# Patient Record
Sex: FEMALE | Race: WHITE | NOT HISPANIC OR LATINO | Employment: OTHER | URBAN - METROPOLITAN AREA
[De-identification: names, ages, dates, MRNs, and addresses within clinical notes are randomized per-mention and may not be internally consistent; named-entity substitution may affect disease eponyms.]

---

## 2023-02-15 PROBLEM — M79.606 LOWER EXTREMITY PAIN: Status: ACTIVE | Noted: 2023-02-15

## 2023-02-15 PROBLEM — H35.30 MACULAR DEGENERATION: Status: ACTIVE | Noted: 2023-02-15

## 2023-02-15 PROBLEM — U07.1 COVID-19 VIRUS INFECTION: Status: ACTIVE | Noted: 2023-02-15

## 2023-02-15 PROBLEM — R10.9 LEFT FLANK PAIN: Status: ACTIVE | Noted: 2023-02-15

## 2023-02-15 PROBLEM — H00.015 HORDEOLUM EXTERNUM OF LEFT LOWER EYELID: Status: ACTIVE | Noted: 2023-02-15

## 2023-02-15 PROBLEM — R41.3 MEMORY CHANGES: Status: ACTIVE | Noted: 2023-02-15

## 2023-02-15 PROBLEM — K12.1 ORAL ULCER: Status: ACTIVE | Noted: 2023-02-15

## 2023-02-15 PROBLEM — E55.9 MILD VITAMIN D DEFICIENCY: Status: ACTIVE | Noted: 2023-02-15

## 2023-02-15 PROBLEM — G47.20 CIRCADIAN RHYTHM SLEEP DISORDER: Status: ACTIVE | Noted: 2023-02-15

## 2023-02-15 PROBLEM — S92.323A FRACTURE OF SECOND METATARSAL BONE: Status: ACTIVE | Noted: 2023-02-15

## 2023-02-15 PROBLEM — N20.0 CALCULUS OF KIDNEY: Status: ACTIVE | Noted: 2023-02-15

## 2023-02-15 PROBLEM — M62.830 LUMBAR PARASPINAL MUSCLE SPASM: Status: ACTIVE | Noted: 2023-02-15

## 2023-02-15 PROBLEM — R06.02 SOB (SHORTNESS OF BREATH): Status: ACTIVE | Noted: 2023-02-15

## 2023-02-15 PROBLEM — J21.9 ACUTE BRONCHITIS AND BRONCHIOLITIS: Status: ACTIVE | Noted: 2023-02-15

## 2023-02-15 PROBLEM — N31.8 SPASTIC NEUROGENIC BLADDER: Status: ACTIVE | Noted: 2023-02-15

## 2023-02-15 PROBLEM — R29.6 FALLING EPISODES: Status: ACTIVE | Noted: 2023-02-15

## 2023-02-15 PROBLEM — R91.8 MULTIPLE PULMONARY NODULES: Status: ACTIVE | Noted: 2023-02-15

## 2023-02-15 PROBLEM — J20.9 ACUTE BRONCHITIS AND BRONCHIOLITIS: Status: ACTIVE | Noted: 2023-02-15

## 2023-02-15 PROBLEM — M53.3 PAIN OF LEFT SACROILIAC JOINT: Status: ACTIVE | Noted: 2023-02-15

## 2023-02-15 PROBLEM — M50.20 HERNIATED CERVICAL DISC: Status: ACTIVE | Noted: 2023-02-15

## 2023-02-15 PROBLEM — L40.9 PSORIASIS: Status: ACTIVE | Noted: 2023-02-15

## 2023-02-15 PROBLEM — K57.32 DIVERTICULITIS, COLON: Status: ACTIVE | Noted: 2023-02-15

## 2023-02-15 PROBLEM — J18.9 PNEUMONIA: Status: ACTIVE | Noted: 2023-02-15

## 2023-02-15 PROBLEM — E03.9 ACQUIRED HYPOTHYROIDISM: Status: ACTIVE | Noted: 2023-02-15

## 2023-02-15 PROBLEM — M54.16 ACUTE LUMBAR RADICULOPATHY: Status: ACTIVE | Noted: 2023-02-15

## 2023-02-15 PROBLEM — G62.9 PERIPHERAL NEUROPATHY: Status: ACTIVE | Noted: 2023-02-15

## 2023-02-15 PROBLEM — R32 URINARY INCONTINENCE: Status: ACTIVE | Noted: 2023-02-15

## 2023-02-15 PROBLEM — M21.40 PES PLANUS: Status: ACTIVE | Noted: 2023-02-15

## 2023-02-15 PROBLEM — L29.9 PRURITUS: Status: ACTIVE | Noted: 2023-02-15

## 2023-02-15 PROBLEM — K13.79 BLEEDING FROM MOUTH: Status: ACTIVE | Noted: 2023-02-15

## 2023-02-15 PROBLEM — M81.0 OSTEOPOROSIS, SENILE: Status: ACTIVE | Noted: 2023-02-15

## 2023-02-15 PROBLEM — N20.1 RIGHT URETERAL CALCULUS: Status: ACTIVE | Noted: 2023-02-15

## 2023-02-15 PROBLEM — I10 HTN (HYPERTENSION): Status: ACTIVE | Noted: 2023-02-15

## 2023-02-15 PROBLEM — R05.9 COUGH: Status: ACTIVE | Noted: 2023-02-15

## 2023-02-15 PROBLEM — I25.10 ATHEROSCLEROTIC HEART DISEASE OF NATIVE CORONARY ARTERY WITHOUT ANGINA PECTORIS: Status: ACTIVE | Noted: 2023-02-15

## 2023-02-15 PROBLEM — M54.50 LOWER BACK PAIN: Status: ACTIVE | Noted: 2023-02-15

## 2023-02-15 PROBLEM — R10.13 ABDOMINAL PAIN, EPIGASTRIC: Status: ACTIVE | Noted: 2023-02-15

## 2023-02-15 PROBLEM — F43.10 POSTTRAUMATIC STRESS DISORDER: Status: ACTIVE | Noted: 2023-02-15

## 2023-02-15 PROBLEM — F41.1 ANXIETY STATE: Status: ACTIVE | Noted: 2023-02-15

## 2023-02-15 PROBLEM — E78.5 HYPERLIPIDEMIA: Status: ACTIVE | Noted: 2023-02-15

## 2023-02-15 PROBLEM — S99.922A TRAUMA OF TOE OF LEFT FOOT: Status: ACTIVE | Noted: 2023-02-15

## 2023-02-15 PROBLEM — G43.109 OCULAR MIGRAINE: Status: ACTIVE | Noted: 2023-02-15

## 2023-02-15 PROBLEM — F41.8 DEPRESSION WITH ANXIETY: Status: ACTIVE | Noted: 2023-02-15

## 2023-02-15 PROBLEM — M06.9 RHEUMATOID ARTHRITIS (MULTI): Status: ACTIVE | Noted: 2023-02-15

## 2023-02-15 PROBLEM — H92.22 OTORRHAGIA OF LEFT EAR: Status: ACTIVE | Noted: 2023-02-15

## 2023-02-15 PROBLEM — M70.61 GREATER TROCHANTERIC BURSITIS, RIGHT: Status: ACTIVE | Noted: 2023-02-15

## 2023-02-15 PROBLEM — R42 DIZZINESS: Status: ACTIVE | Noted: 2023-02-15

## 2023-02-15 PROBLEM — K29.70 GASTRITIS: Status: ACTIVE | Noted: 2023-02-15

## 2023-02-15 PROBLEM — J45.909 ASTHMA (HHS-HCC): Status: ACTIVE | Noted: 2023-02-15

## 2023-02-15 PROBLEM — J45.901 ASTHMA EXACERBATION (HHS-HCC): Status: ACTIVE | Noted: 2023-02-15

## 2023-02-15 RX ORDER — LOSARTAN POTASSIUM 50 MG/1
50 TABLET ORAL DAILY
COMMUNITY
Start: 2021-03-05 | End: 2024-01-10 | Stop reason: SDUPTHER

## 2023-02-15 RX ORDER — FLUTICASONE PROPIONATE 110 UG/1
2 AEROSOL, METERED RESPIRATORY (INHALATION)
COMMUNITY
Start: 2021-07-28

## 2023-02-15 RX ORDER — GABAPENTIN 100 MG/1
1 CAPSULE ORAL NIGHTLY
COMMUNITY
Start: 2022-04-01 | End: 2023-10-16

## 2023-02-15 RX ORDER — DOXEPIN HYDROCHLORIDE 10 MG/1
20 CAPSULE ORAL NIGHTLY
COMMUNITY
Start: 2022-08-22 | End: 2023-10-16

## 2023-02-15 RX ORDER — VIT C/E/ZN/COPPR/LUTEIN/ZEAXAN 250MG-90MG
25 CAPSULE ORAL DAILY
COMMUNITY

## 2023-02-15 RX ORDER — NITROGLYCERIN 0.4 MG/1
0.4 TABLET SUBLINGUAL EVERY 5 MIN PRN
COMMUNITY
Start: 2021-02-09

## 2023-02-15 RX ORDER — ATORVASTATIN CALCIUM 40 MG/1
1 TABLET, FILM COATED ORAL NIGHTLY
COMMUNITY
Start: 2021-03-08

## 2023-02-15 RX ORDER — LEVOTHYROXINE SODIUM 25 UG/1
25 TABLET ORAL DAILY
COMMUNITY
Start: 2021-10-28 | End: 2023-10-16 | Stop reason: SDUPTHER

## 2023-02-15 RX ORDER — GABAPENTIN 300 MG/1
1 CAPSULE ORAL NIGHTLY
COMMUNITY
Start: 2022-08-08 | End: 2023-10-16

## 2023-02-15 RX ORDER — ASPIRIN 81 MG/1
1 TABLET ORAL DAILY
COMMUNITY
Start: 2021-12-03 | End: 2023-11-03 | Stop reason: ALTCHOICE

## 2023-02-15 RX ORDER — SERTRALINE HYDROCHLORIDE 25 MG/1
0.5 TABLET, FILM COATED ORAL DAILY
COMMUNITY
Start: 2021-03-08

## 2023-02-15 RX ORDER — METOPROLOL TARTRATE 25 MG/1
6.25 TABLET, FILM COATED ORAL 2 TIMES DAILY
COMMUNITY
Start: 2021-03-08

## 2023-02-15 RX ORDER — CLOPIDOGREL BISULFATE 75 MG/1
1 TABLET ORAL DAILY
COMMUNITY
Start: 2021-12-03 | End: 2023-10-16 | Stop reason: SDUPTHER

## 2023-02-17 PROBLEM — R31.9 HEMATURIA: Status: ACTIVE | Noted: 2023-02-17

## 2023-04-10 ENCOUNTER — APPOINTMENT (OUTPATIENT)
Dept: PRIMARY CARE | Facility: CLINIC | Age: 88
End: 2023-04-10
Payer: MEDICARE

## 2023-04-25 ENCOUNTER — LAB (OUTPATIENT)
Dept: LAB | Facility: LAB | Age: 88
End: 2023-04-25
Payer: MEDICARE

## 2023-04-25 ENCOUNTER — OFFICE VISIT (OUTPATIENT)
Dept: PRIMARY CARE | Facility: CLINIC | Age: 88
End: 2023-04-25
Payer: MEDICARE

## 2023-04-25 VITALS
BODY MASS INDEX: 24.8 KG/M2 | WEIGHT: 127 LBS | DIASTOLIC BLOOD PRESSURE: 84 MMHG | TEMPERATURE: 96.4 F | SYSTOLIC BLOOD PRESSURE: 130 MMHG

## 2023-04-25 DIAGNOSIS — E78.5 HYPERLIPIDEMIA, UNSPECIFIED HYPERLIPIDEMIA TYPE: ICD-10-CM

## 2023-04-25 DIAGNOSIS — Z00.00 ROUTINE GENERAL MEDICAL EXAMINATION AT HEALTH CARE FACILITY: Primary | ICD-10-CM

## 2023-04-25 DIAGNOSIS — J45.909 ASTHMA, UNSPECIFIED ASTHMA SEVERITY, UNSPECIFIED WHETHER COMPLICATED, UNSPECIFIED WHETHER PERSISTENT (HHS-HCC): ICD-10-CM

## 2023-04-25 DIAGNOSIS — I25.10 ATHEROSCLEROSIS OF NATIVE CORONARY ARTERY OF NATIVE HEART WITHOUT ANGINA PECTORIS: ICD-10-CM

## 2023-04-25 DIAGNOSIS — E55.9 MILD VITAMIN D DEFICIENCY: ICD-10-CM

## 2023-04-25 DIAGNOSIS — F41.1 ANXIETY STATE: ICD-10-CM

## 2023-04-25 DIAGNOSIS — E03.9 ACQUIRED HYPOTHYROIDISM: ICD-10-CM

## 2023-04-25 DIAGNOSIS — G62.9 NEUROPATHY: ICD-10-CM

## 2023-04-25 DIAGNOSIS — G62.9 NEUROPATHY: Primary | ICD-10-CM

## 2023-04-25 DIAGNOSIS — I10 HYPERTENSION, UNSPECIFIED TYPE: ICD-10-CM

## 2023-04-25 PROCEDURE — G0439 PPPS, SUBSEQ VISIT: HCPCS | Performed by: INTERNAL MEDICINE

## 2023-04-25 PROCEDURE — 3075F SYST BP GE 130 - 139MM HG: CPT | Performed by: INTERNAL MEDICINE

## 2023-04-25 PROCEDURE — 3079F DIAST BP 80-89 MM HG: CPT | Performed by: INTERNAL MEDICINE

## 2023-04-25 PROCEDURE — 99214 OFFICE O/P EST MOD 30 MIN: CPT | Performed by: INTERNAL MEDICINE

## 2023-04-25 RX ORDER — GABAPENTIN 100 MG/1
100 CAPSULE ORAL 3 TIMES DAILY
Qty: 90 CAPSULE | Refills: 5 | Status: SHIPPED | OUTPATIENT
Start: 2023-04-25 | End: 2023-11-03 | Stop reason: SDUPTHER

## 2023-04-25 ASSESSMENT — ENCOUNTER SYMPTOMS
COUGH: 0
BLOOD IN STOOL: 0
DIZZINESS: 0
DYSURIA: 0
DIFFICULTY URINATING: 0
CONFUSION: 0
DIARRHEA: 0
FEVER: 0
FLANK PAIN: 0
VOMITING: 0
CONSTIPATION: 0
ARTHRALGIAS: 0
NERVOUS/ANXIOUS: 0
HEMATURIA: 0
SORE THROAT: 0
UNEXPECTED WEIGHT CHANGE: 0
POLYDIPSIA: 0
HEADACHES: 0
LIGHT-HEADEDNESS: 0
SLEEP DISTURBANCE: 0
NUMBNESS: 0
CHILLS: 0
BACK PAIN: 1
MYALGIAS: 0
PALPITATIONS: 0
NAUSEA: 0
SHORTNESS OF BREATH: 0
ABDOMINAL PAIN: 0
FREQUENCY: 0

## 2023-04-25 ASSESSMENT — PATIENT HEALTH QUESTIONNAIRE - PHQ9
2. FEELING DOWN, DEPRESSED OR HOPELESS: NOT AT ALL
SUM OF ALL RESPONSES TO PHQ9 QUESTIONS 1 AND 2: 0
1. LITTLE INTEREST OR PLEASURE IN DOING THINGS: NOT AT ALL

## 2023-04-25 NOTE — PROGRESS NOTES
Subjective   Reason for Visit: Annita Mcleod is an 89 y.o. female here for a Medicare Wellness visit.               She is here with daughter. She is adherent with meds. Complains of back pain, especially int the morning, but is otherwise doing well.     Chief Complaint: Medicare Wellness Exam/Comprehensive Problem Focused Follow Up and Physical Exam    HPI:      Active Problem List      Comprehensive Medical/Surgical/Social/Family History  Past Medical History:   Diagnosis Date    Abnormal coagulation profile 04/29/2019    Abnormal bleeding time    Other conditions influencing health status 02/24/2017    Dog bite of extremity    Pain in unspecified hip 05/24/2017    Joint pain, hip    Personal history of diseases of the blood and blood-forming organs and certain disorders involving the immune mechanism 04/29/2019    History of bleeding disorder    Personal history of other diseases of the musculoskeletal system and connective tissue 05/27/2014    History of muscle spasm    Personal history of other diseases of the musculoskeletal system and connective tissue 01/18/2017    History of low back pain    Personal history of other specified conditions 02/24/2017    History of abdominal pain     No past surgical history on file.  Social History     Social History Narrative    Not on file         Allergies and Medications  Patient has no known allergies.  Current Outpatient Medications on File Prior to Visit   Medication Sig Dispense Refill    aspirin 81 mg EC tablet Take 1 tablet (81 mg) by mouth once daily.      atorvastatin (Lipitor) 40 mg tablet Take 1 tablet (40 mg) by mouth once daily at bedtime.      clopidogrel (Plavix) 75 mg tablet Take 1 tablet (75 mg) by mouth once daily.      fluticasone (Flovent) 110 mcg/actuation inhaler Inhale 2 puffs  in the morning and 2 puffs in the evening.      gabapentin (Neurontin) 100 mg capsule Take 1 capsule (100 mg) by mouth once daily at bedtime.      levothyroxine (Synthroid,  "Levoxyl) 25 mcg tablet Take 2 tablets (50 mcg) by mouth once daily.      losartan (Cozaar) 50 mg tablet Take 1 tablet (50 mg) by mouth once daily.      metoprolol tartrate (Lopressor) 25 mg tablet Take 1 tablet (25 mg) by mouth in the morning and 1 tablet (25 mg) before bedtime.      sertraline (Zoloft) 25 mg tablet Take 1 tablet (25 mg) by mouth once daily.      cholecalciferol (Vitamin D-3) 25 MCG (1000 UT) capsule Take 1 capsule (25 mcg) by mouth once daily.      doxepin (SINEquan) 10 mg capsule Take 2 capsules (20 mg) by mouth once daily at bedtime.      gabapentin (Neurontin) 300 mg capsule Take 1 capsule (300 mg) by mouth once daily at bedtime.      nitroglycerin (Nitrostat) 0.4 mg SL tablet Place 1 tablet (0.4 mg) under the tongue every 5 minutes if needed for chest pain.       No current facility-administered medications on file prior to visit.       Medications and Supplements  prescribed by me and other practitioners or clinical pharmacist (such as prescriptions, OTC's, herbal therapies and supplements) were reviewed and documented in the medical record.    Tobacco/Alcohol/Opioid use, as well as Illicit Drug Use was screened for/reviewed and documented in Social History section and medication list as appropriate  Activities of Daily Living  In your present state of health, do you have any difficulty performing the following activities?:   Preparing food and eating?: No  Bathing yourself: No  Getting dressed: No  Using the toilet:No  Moving around from place to place: No  In the past year have you fallen or had a near fall?:No    Depression Screen  (Note: if answer to either of the following is \"Yes\", then a more complete depression screening is indicated)   Q1: Over the past two weeks, have you felt down, depressed or hopeless? No  Q2: Over the past two weeks, have you felt little interest or pleasure in doing things? No    Current exercise habits: Home exercise routine includes walking half hrs per day. "   Dietary issues discussed: Yes  Hearing difficulties: No  Safe in current home environment: yes  Visual Acuity assessed: yes  Cognitive Impairment assessed: yes       Advance directives  Advanced Care Planning (including a Living Will, Healthcare POA, as well as specific end of life choices and/or directives), was discussed for approximately 16 minutes with the patient and/or surrogate, voluntarily, and documented in the medical record.     Cardiac Risk Assessment  Cardiovascular risk was discussed and, if needed, lifestyle modifications recommended, including nutritional choices, exercise, and elimination of habits contributing to risk. We agreed on a plan to reduce the current cardiovascular risk based on above discussion as needed.  Aspirin use/disuse was discussed after reviewing the updated guidelines below:    Consider low dose Aspirin ( mg) use if the benefit for cardiovascular disease prevention outweighs risk for bleeding complications.   In general, low dose ASA should be considered:  In patients WITHOUT prior MI/stroke/PAD (primary prevention):   a. Age <60: Use if 10-year cardiovascular disease risk >20%, with discussion of risks and benefits with patient  b. Age 60-<70: Use if 10-year cardiovascular disease risk >20% and low bleeding (e.g., gastrointenstinal) risk, with discussion of risks and benefits with patient  c. Age >=70: Do not use    In patients WITH prior MI/stroke/PAD (secondary prevention):   Generally use unless extremely high bleeding (e.g., gastrointenstinal) risk, with discussion of risks and benefits with patient    ROS otherwise negative aside from what was mentioned above in HPI.    Vitals  Vitals:    04/25/23 1146   BP: 130/84   Temp: 35.8 °C (96.4 °F)     Body mass index is 24.8 kg/m².        During the course of the visit the patient was educated and counseled about age appropriate screening and preventive services. Completed preventive screenings were documented in the  chart and orders were placed for outstanding screenings/procedures as documented in the Assessment and Plan.      Patient Instructions (the written plan) was given to the patient at check out.      Shen Cardona DO     Patient Care Team:  Viv Chadwick MD as PCP - General  Jordi Aceves MD as PCP - [employer] PCP     Review of Systems   Constitutional:  Negative for chills, fever and unexpected weight change.   HENT:  Negative for sore throat.    Eyes:  Negative for visual disturbance.   Respiratory:  Negative for cough and shortness of breath.    Cardiovascular:  Negative for chest pain, palpitations and leg swelling.   Gastrointestinal:  Negative for abdominal pain, blood in stool, constipation, diarrhea, nausea and vomiting.   Endocrine: Negative for cold intolerance, heat intolerance, polydipsia and polyuria.   Genitourinary:  Negative for difficulty urinating, dysuria, flank pain, frequency, hematuria and urgency.   Musculoskeletal:  Positive for back pain. Negative for arthralgias and myalgias.   Neurological:  Negative for dizziness, light-headedness, numbness and headaches.   Psychiatric/Behavioral:  Negative for confusion, sleep disturbance and suicidal ideas. The patient is not nervous/anxious.        Objective   Vitals:  /84   Temp 35.8 °C (96.4 °F)   Wt 57.6 kg (127 lb)   BMI 24.80 kg/m²       Physical Exam  Constitutional:       Appearance: Normal appearance.   HENT:      Head: Normocephalic and atraumatic.      Right Ear: Tympanic membrane and external ear normal.      Left Ear: Tympanic membrane and external ear normal.      Nose: Nose normal.      Mouth/Throat:      Mouth: Mucous membranes are moist.      Pharynx: Oropharynx is clear.   Eyes:      Extraocular Movements: Extraocular movements intact.      Conjunctiva/sclera: Conjunctivae normal.      Pupils: Pupils are equal, round, and reactive to light.   Cardiovascular:      Rate and Rhythm: Normal rate and  regular rhythm.      Pulses: Normal pulses.      Heart sounds: Normal heart sounds.   Pulmonary:      Effort: Pulmonary effort is normal.      Breath sounds: Normal breath sounds.   Abdominal:      General: Bowel sounds are normal.      Palpations: Abdomen is soft.   Musculoskeletal:      Cervical back: Normal range of motion and neck supple.   Skin:     General: Skin is warm and dry.   Neurological:      General: No focal deficit present.      Mental Status: She is alert and oriented to person, place, and time. Mental status is at baseline.   Psychiatric:         Mood and Affect: Mood normal.         Assessment/Plan   Problem List Items Addressed This Visit          Respiratory    Asthma - Primary       Circulatory    Atherosclerotic heart disease of native coronary artery without angina pectoris    Relevant Orders    CBC and Auto Differential    Comprehensive Metabolic Panel    HTN (hypertension)    Relevant Orders    CBC and Auto Differential    Comprehensive Metabolic Panel       Endocrine/Metabolic    Acquired hypothyroidism    Relevant Orders    Tsh With Reflex To Free T4 If Abnormal    Mild vitamin D deficiency    Relevant Orders    Vitamin D 25-Hydroxy,Total       Other    Anxiety state    Hyperlipidemia    Relevant Orders    Lipid panel        #Back pain  - states she has pain more in the AM, so will change Gabapentin 300mg at bedtime to 100mg TID  - if not effective, will try Duloxetine in the future    #HCM  - obtain routine labs

## 2023-04-25 NOTE — PROGRESS NOTES
I reviewed with the resident the medical history and the resident’s findings on physical examination.  I discussed with the resident the patient’s diagnosis and concur with the treatment plan as documented in the resident note.     I saw and evaluated the patient. I personally obtained the key and critical portions of the history and physical exam or was physically present for key and critical portions performed by the trainee. I reviewed the trainee's documentation and discussed the patient with the trainee. I agree with the trainee's medical decision making, as documented on the trainee's note.     Viv Chadwick MD

## 2023-10-16 ENCOUNTER — OFFICE VISIT (OUTPATIENT)
Dept: PRIMARY CARE | Facility: CLINIC | Age: 88
End: 2023-10-16
Payer: MEDICARE

## 2023-10-16 ENCOUNTER — LAB (OUTPATIENT)
Dept: LAB | Facility: LAB | Age: 88
End: 2023-10-16
Payer: MEDICARE

## 2023-10-16 VITALS
WEIGHT: 126.4 LBS | HEIGHT: 60 IN | DIASTOLIC BLOOD PRESSURE: 63 MMHG | OXYGEN SATURATION: 97 % | SYSTOLIC BLOOD PRESSURE: 125 MMHG | BODY MASS INDEX: 24.81 KG/M2 | HEART RATE: 70 BPM

## 2023-10-16 DIAGNOSIS — R20.2 PARESTHESIA OF FOOT, BILATERAL: ICD-10-CM

## 2023-10-16 DIAGNOSIS — E03.9 HYPOTHYROIDISM, UNSPECIFIED TYPE: ICD-10-CM

## 2023-10-16 DIAGNOSIS — M06.9 RHEUMATOID ARTHRITIS, INVOLVING UNSPECIFIED SITE, UNSPECIFIED WHETHER RHEUMATOID FACTOR PRESENT (MULTI): ICD-10-CM

## 2023-10-16 DIAGNOSIS — Z95.1 H/O FOUR VESSEL CORONARY ARTERY BYPASS GRAFT: ICD-10-CM

## 2023-10-16 DIAGNOSIS — E78.5 HYPERLIPIDEMIA, UNSPECIFIED HYPERLIPIDEMIA TYPE: ICD-10-CM

## 2023-10-16 DIAGNOSIS — Z23 NEED FOR INFLUENZA VACCINATION: ICD-10-CM

## 2023-10-16 DIAGNOSIS — I25.810 CORONARY ARTERY DISEASE INVOLVING CORONARY BYPASS GRAFT OF NATIVE HEART WITHOUT ANGINA PECTORIS: ICD-10-CM

## 2023-10-16 DIAGNOSIS — E03.9 ACQUIRED HYPOTHYROIDISM: ICD-10-CM

## 2023-10-16 DIAGNOSIS — I10 PRIMARY HYPERTENSION: ICD-10-CM

## 2023-10-16 DIAGNOSIS — Z76.89 ENCOUNTER TO ESTABLISH CARE: Primary | ICD-10-CM

## 2023-10-16 DIAGNOSIS — I21.A9 OTHER MYOCARDIAL INFARCTION TYPE (MULTI): ICD-10-CM

## 2023-10-16 LAB
ALBUMIN SERPL BCP-MCNC: 4.4 G/DL (ref 3.4–5)
ALP SERPL-CCNC: 84 U/L (ref 33–136)
ALT SERPL W P-5'-P-CCNC: 13 U/L (ref 7–45)
ANION GAP SERPL CALC-SCNC: 12 MMOL/L (ref 10–20)
AST SERPL W P-5'-P-CCNC: 16 U/L (ref 9–39)
BILIRUB SERPL-MCNC: 0.4 MG/DL (ref 0–1.2)
BUN SERPL-MCNC: 21 MG/DL (ref 6–23)
CALCIUM SERPL-MCNC: 9.3 MG/DL (ref 8.6–10.3)
CHLORIDE SERPL-SCNC: 105 MMOL/L (ref 98–107)
CO2 SERPL-SCNC: 28 MMOL/L (ref 21–32)
CREAT SERPL-MCNC: 0.65 MG/DL (ref 0.5–1.05)
ERYTHROCYTE [DISTWIDTH] IN BLOOD BY AUTOMATED COUNT: 13.9 % (ref 11.5–14.5)
GFR SERPL CREATININE-BSD FRML MDRD: 86 ML/MIN/1.73M*2
GLUCOSE SERPL-MCNC: 87 MG/DL (ref 74–99)
HCT VFR BLD AUTO: 44.6 % (ref 36–46)
HGB BLD-MCNC: 14.2 G/DL (ref 12–16)
MCH RBC QN AUTO: 27.6 PG (ref 26–34)
MCHC RBC AUTO-ENTMCNC: 31.8 G/DL (ref 32–36)
MCV RBC AUTO: 87 FL (ref 80–100)
NRBC BLD-RTO: 0 /100 WBCS (ref 0–0)
PLATELET # BLD AUTO: 329 X10*3/UL (ref 150–450)
PMV BLD AUTO: 8.8 FL (ref 7.5–11.5)
POTASSIUM SERPL-SCNC: 4.3 MMOL/L (ref 3.5–5.3)
PROT SERPL-MCNC: 6.7 G/DL (ref 6.4–8.2)
RBC # BLD AUTO: 5.15 X10*6/UL (ref 4–5.2)
SODIUM SERPL-SCNC: 141 MMOL/L (ref 136–145)
T4 FREE SERPL-MCNC: 0.86 NG/DL (ref 0.61–1.12)
TSH SERPL-ACNC: 4.9 MIU/L (ref 0.44–3.98)
WBC # BLD AUTO: 6 X10*3/UL (ref 4.4–11.3)

## 2023-10-16 PROCEDURE — 80053 COMPREHEN METABOLIC PANEL: CPT

## 2023-10-16 PROCEDURE — 1036F TOBACCO NON-USER: CPT | Performed by: STUDENT IN AN ORGANIZED HEALTH CARE EDUCATION/TRAINING PROGRAM

## 2023-10-16 PROCEDURE — 1160F RVW MEDS BY RX/DR IN RCRD: CPT | Performed by: STUDENT IN AN ORGANIZED HEALTH CARE EDUCATION/TRAINING PROGRAM

## 2023-10-16 PROCEDURE — 82607 VITAMIN B-12: CPT

## 2023-10-16 PROCEDURE — 3078F DIAST BP <80 MM HG: CPT | Performed by: STUDENT IN AN ORGANIZED HEALTH CARE EDUCATION/TRAINING PROGRAM

## 2023-10-16 PROCEDURE — G0008 ADMIN INFLUENZA VIRUS VAC: HCPCS | Performed by: STUDENT IN AN ORGANIZED HEALTH CARE EDUCATION/TRAINING PROGRAM

## 2023-10-16 PROCEDURE — 85027 COMPLETE CBC AUTOMATED: CPT

## 2023-10-16 PROCEDURE — 1159F MED LIST DOCD IN RCRD: CPT | Performed by: STUDENT IN AN ORGANIZED HEALTH CARE EDUCATION/TRAINING PROGRAM

## 2023-10-16 PROCEDURE — 83036 HEMOGLOBIN GLYCOSYLATED A1C: CPT

## 2023-10-16 PROCEDURE — 90662 IIV NO PRSV INCREASED AG IM: CPT | Performed by: STUDENT IN AN ORGANIZED HEALTH CARE EDUCATION/TRAINING PROGRAM

## 2023-10-16 PROCEDURE — 82746 ASSAY OF FOLIC ACID SERUM: CPT

## 2023-10-16 PROCEDURE — 84439 ASSAY OF FREE THYROXINE: CPT

## 2023-10-16 PROCEDURE — 36415 COLL VENOUS BLD VENIPUNCTURE: CPT

## 2023-10-16 PROCEDURE — 3074F SYST BP LT 130 MM HG: CPT | Performed by: STUDENT IN AN ORGANIZED HEALTH CARE EDUCATION/TRAINING PROGRAM

## 2023-10-16 PROCEDURE — 99214 OFFICE O/P EST MOD 30 MIN: CPT | Performed by: STUDENT IN AN ORGANIZED HEALTH CARE EDUCATION/TRAINING PROGRAM

## 2023-10-16 PROCEDURE — 84443 ASSAY THYROID STIM HORMONE: CPT

## 2023-10-16 RX ORDER — CLOPIDOGREL BISULFATE 75 MG/1
75 TABLET ORAL DAILY
Qty: 90 TABLET | Refills: 3 | Status: SHIPPED | OUTPATIENT
Start: 2023-10-16

## 2023-10-16 RX ORDER — AMLODIPINE BESYLATE 2.5 MG/1
2.5 TABLET ORAL 2 TIMES DAILY
COMMUNITY

## 2023-10-16 RX ORDER — LEVOTHYROXINE SODIUM 25 UG/1
25 TABLET ORAL DAILY
Qty: 90 TABLET | Refills: 3 | Status: SHIPPED | OUTPATIENT
Start: 2023-10-16

## 2023-10-16 NOTE — PROGRESS NOTES
"Subjective   Patient ID: Annita Mcleod is a 86 y.o. female who presents for New Patient Visit (Establish care. ).        HPI    Est care   Pt's PMH, PSH, SH, FH , meds and allergies was obtained / reviewed and updated .  Is here with her daughter who is the primary historian      CABG in 2005  Est with cardiology at    Recently had lipids done   Wonders if she can lower the dose of lipitor  to 20mg     COVId 19 infection in 2021 , paresthesia in LEs since then , worse in feet  \"someone pouring hot water \"  on her feet   Gabapentin did not help     Asthma     HTN        Visit Vitals  /63   Pulse 70   Ht 1.524 m (5')   Wt 57.3 kg (126 lb 6.4 oz)   SpO2 97%   BMI 24.69 kg/m²   Smoking Status Never   BSA 1.56 m²      No LMP recorded.     Review of Systems    Constitutional : No feeling poorly / fevers/ chills / night sweats/ fatigue   Cardiovascular : No CP /Palpitations/ lower extremity edema / syncope   Respiratory : No Cough /GODOY/Dyspnea at rest   Gastrointestinal : No abd pain / N/V  No bloody stools/ melena / constipation  Endo : No polyuria/polydipsia/ muscle weakness / sluggishness   CNS: No confusion / HA/ tingling/ numbness/ weakness of extremities  Psychiatric: No anxiety/ depression/ SI/HI    All other systems have been reviewed and are negative for complaint       Physical Exam    Constitutional : Vitals reviewed. Alert and in no distress  Cardiovascular : RRR, Normal S1, S2, No pericardial rub/ gallop, no peripheral edema   Pulmonary: No respiratory distress, CTAB   MSK : Normal gait and station , strength and tone     Neurologic : CNs 2-12 grossly intact , no obvious FNDs  Psych : A,Ox3, normal mood and affect      Assessment/Plan   Diagnoses and all orders for this visit:  Encounter to establish care  Need for influenza vaccination  -     Flu vaccine, quadrivalent, high-dose, preservative free, age 65y+ (FLUZONE)  Rheumatoid arthritis, involving unspecified site, unspecified whether rheumatoid " factor present (CMS/[facility])  H/O four vessel coronary artery bypass graft  -     clopidogrel (Plavix) 75 mg tablet; Take 1 tablet (75 mg) by mouth once daily.  Coronary artery disease involving coronary bypass graft of native heart without angina pectoris  -     clopidogrel (Plavix) 75 mg tablet; Take 1 tablet (75 mg) by mouth once daily.  Primary hypertension  Hyperlipidemia, unspecified hyperlipidemia type  Hypothyroidism, unspecified type  -     levothyroxine (Synthroid, Levoxyl) 25 mcg tablet; Take 1 tablet (25 mcg) by mouth once daily.  Acquired hypothyroidism  Paresthesia of foot, bilateral  -     EMG & nerve conduction; Future  -     Referral to Neurology; Future  -     Vitamin B12; Future  -     Folate; Future  -     CBC; Future  -     Comprehensive Metabolic Panel; Future  -     TSH with reflex to Free T4 if abnormal; Future  Other myocardial infarction type (CMS/[facility])  -     Hemoglobin A1C; Future      85 y/o female with CAD s/p 2005 with MI in 2021 s/p PCI , mild - moderate depression , hypothyroidism and paresthesia of LE    # CAD : Est with cardiology at [facility]   To obtain refills from cardiology (ASA, plavix, Statin and BB )   She is also taking amlodipine 2.5mg BID   Continue statin 40mg     # Paresthesia : EMG and NM referral   Did not notice improvement with gabapentin     # Asthma : Stable     # Mild - moderate depression : on 12.5mg of Zoloft     Conditions addressed and mgmt as noted above.  Pertinent labs, images/ imaging reports , chart review was done .   Age appropriate labs / labs for mgmt of chronic medical conditions ordered, further mgmt pending the results.       RTO in 6m for CPE /MCW

## 2023-10-17 LAB
EST. AVERAGE GLUCOSE BLD GHB EST-MCNC: 108 MG/DL
FOLATE SERPL-MCNC: 11.4 NG/ML
HBA1C MFR BLD: 5.4 %
VIT B12 SERPL-MCNC: 212 PG/ML (ref 211–911)

## 2023-10-18 ENCOUNTER — TELEPHONE (OUTPATIENT)
Dept: PHARMACY | Facility: HOSPITAL | Age: 88
End: 2023-10-18
Payer: MEDICARE

## 2023-10-18 NOTE — TELEPHONE ENCOUNTER
Population Health: Outreach by Ambulatory Pharmacy Team    Payor: [employer] [place]  Reason: Adherence  Medication:   ATORVASTATIN TAB 40MG   LOSARTAN POT TAB 50MG     Outcome: Left Voicemail Atorvastatin was decreased to 20mg by pcp in Epic note, adherence for losartan still need to be addressed.     THELMA LUCIANO CPhT

## 2023-11-03 ENCOUNTER — OFFICE VISIT (OUTPATIENT)
Dept: PRIMARY CARE | Facility: CLINIC | Age: 88
End: 2023-11-03
Payer: MEDICARE

## 2023-11-03 VITALS
HEART RATE: 67 BPM | OXYGEN SATURATION: 96 % | HEIGHT: 60 IN | SYSTOLIC BLOOD PRESSURE: 105 MMHG | DIASTOLIC BLOOD PRESSURE: 60 MMHG | WEIGHT: 128.4 LBS | BODY MASS INDEX: 25.21 KG/M2

## 2023-11-03 DIAGNOSIS — M47.27 LUMBOSACRAL SPONDYLOSIS WITH RADICULOPATHY: Primary | ICD-10-CM

## 2023-11-03 DIAGNOSIS — G62.9 NEUROPATHY: ICD-10-CM

## 2023-11-03 PROCEDURE — 3078F DIAST BP <80 MM HG: CPT | Performed by: STUDENT IN AN ORGANIZED HEALTH CARE EDUCATION/TRAINING PROGRAM

## 2023-11-03 PROCEDURE — 99214 OFFICE O/P EST MOD 30 MIN: CPT | Performed by: STUDENT IN AN ORGANIZED HEALTH CARE EDUCATION/TRAINING PROGRAM

## 2023-11-03 PROCEDURE — 1159F MED LIST DOCD IN RCRD: CPT | Performed by: STUDENT IN AN ORGANIZED HEALTH CARE EDUCATION/TRAINING PROGRAM

## 2023-11-03 PROCEDURE — 1160F RVW MEDS BY RX/DR IN RCRD: CPT | Performed by: STUDENT IN AN ORGANIZED HEALTH CARE EDUCATION/TRAINING PROGRAM

## 2023-11-03 PROCEDURE — 1036F TOBACCO NON-USER: CPT | Performed by: STUDENT IN AN ORGANIZED HEALTH CARE EDUCATION/TRAINING PROGRAM

## 2023-11-03 PROCEDURE — 3074F SYST BP LT 130 MM HG: CPT | Performed by: STUDENT IN AN ORGANIZED HEALTH CARE EDUCATION/TRAINING PROGRAM

## 2023-11-03 RX ORDER — GABAPENTIN 300 MG/1
300 CAPSULE ORAL 3 TIMES DAILY
Qty: 270 CAPSULE | Refills: 1 | Status: SHIPPED | OUTPATIENT
Start: 2023-11-03 | End: 2024-05-01

## 2023-11-03 NOTE — PROGRESS NOTES
Subjective   Patient ID: Annita Mcleod is a 86 y.o. female who presents for Back Pain (Back pain. ).        HPI    Is here with her daughter , who is also the historian     Chronic low back pain , MRI in 2017 - Multi level disc changes   Uses tylenol and salon patches   Occasional radiation to the lower extremities     Visit Vitals  /60   Pulse 67   Ht 1.524 m (5')   Wt 58.2 kg (128 lb 6.4 oz)   SpO2 96%   BMI 25.08 kg/m²   Smoking Status Never   BSA 1.57 m²      No LMP recorded.     Review of Systems    Constitutional : No feeling poorly / fevers/ chills / night sweats/ fatigue   Cardiovascular : No CP /Palpitations/ lower extremity edema / syncope   Respiratory : No Cough /GODOY/Dyspnea at rest     CNS: No confusion / HA/ tingling/ numbness/ weakness of extremities  Psychiatric: No anxiety/ depression/ SI/HI    All other systems have been reviewed and are negative for complaint       Physical Exam    Constitutional : Vitals reviewed. Alert and in no distress  Cardiovascular : RRR, Normal S1, S2, No pericardial rub/ gallop, no peripheral edema   Pulmonary: No respiratory distress, CTAB   MSK : Normal gait and station , strength and tone     Neurologic : CNs 2-12 grossly intact , no obvious FNDs  Psych : A,Ox3, normal mood and affect      Assessment/Plan   Diagnoses and all orders for this visit:  Lumbosacral spondylosis with radiculopathy  -     MR lumbar spine wo IV contrast; Future  -     Referral to Pain Medicine; Future  -     Referral to Physical Therapy; Future  -     Referral to [facility]; Future  Neuropathy  -     gabapentin (Neurontin) 300 mg capsule; Take 1 capsule (300 mg) by mouth 3 times a day.      Can increase Gabapentin 300mg TID   Rpt imaging   Mgmt as noted above       Conditions addressed and mgmt as noted above.  Pertinent labs, images/ imaging reports , chart review was done .   Age appropriate labs / labs for mgmt of chronic medical conditions ordered, further mgmt pending the  results.

## 2023-11-06 ENCOUNTER — APPOINTMENT (OUTPATIENT)
Dept: NEUROLOGY | Facility: CLINIC | Age: 88
End: 2023-11-06
Payer: MEDICARE

## 2023-11-07 NOTE — PROGRESS NOTES
Physical Therapy  Physical Therapy Orthopedic Evaluation    Patient Name: Annita Mcleod  MRN: [id number]  Today's Date: 11/8/2023  Time Calculation  Start Time: 0745  Stop Time: 0830  Time Calculation (min): 45 min    Insurance:  Visit number: 1 of [place]  Authorization info: no auth needed  Insurance Type: [facility] Medicare  Cert date start: 11/8/23        Cert date end: 2/5/24                General:  Reason for visit: LBP and lumbar radiculopathy  Referred by: Dr. Desiree Perez    Current Problem  1. Lower back pain        2. Lumbosacral spondylosis with radiculopathy  Referral to Physical Therapy    Follow Up In Physical Therapy          Precautions  STEADI Fall Risk Score (The score of 4 or more indicates an increased risk of falling): 4    Medical History Form: Reviewed (scanned into chart)    Subjective:     Chief Complaint: Patient presents to clinic with LBP and lumbar radiculopathy L>R with h/o DDD. Is scheduled for another MRI. Has not tried injections. Sees pain management tomorrow.   Onset Date: 1/1/21  TREVOR: got worse after getting COVID    Current Condition:   Worse    Pain:  Pain Assessment: 0-10  Pain Score: 5 - Moderate pain  Location: across low back, pain from shin down to feet   Description: sharp  Aggravating Factors: sitting  Relieving Factors: gabapentin, tylenol, lidocaine patch, heating pad    Relevant Information (PMH & Previous Tests/Imaging): having new MRI in a week.  Previous Interventions/Treatments: None    Prior Level of Function (PLOF)  Patient previously independent with all ADLs  Exercise/Physical Activity: none  Work/School: retired     Patients Living Environment: Reviewed and no concern  Stairs:    Primary Language: English    Patient's Goal(s) for Therapy: Decrease pain and increase quality of life    Red Flags: Do you have any of the following? No  Fever/chills, unexplained weight changes, dizziness/fainting, unexplained change in bowel or bladder functions, unexplained malaise or  muscle weakness, night pain/sweats, numbness or tingling    Objective:    Gait: decreased lavell      Balance: SLS-     R: 2 sec.           L: 2 sec.    Palpation: TTP across low back    Flexibility: mod tight B HS, quad, HF, gastroc    ROM    Lumbar AROM (%)    Flexion: 50% P  Extension: 10% P  (L) Side Bend: 10% P  (R) Side Bend: 10% P  (L) Rotation: 75% P  (R) Rotation: 50% P      Hip AROM (Degrees): R=L       Strength Testing (pain with all testing)    LE strength MMT  R L  Hip flexion  4/5 4/5  Hip extension  2+/5 2+/5  Hip abduction  3+/5 3+/5  Hip adduction  3-/5 3-/5  Hip IR   3+/5 3+/5  Hip ER   3+/5 3+/5  Knee extension 4-/5 4-/5  Knee flexion  4/5 4-/5          Special Tests    Slump Test: positive B    Radicular Symptoms: L>R from shin down to foot      Outcome Measures:  Other Measures  Oswestry Disablity Index (BLANCHE): 29/50     EDUCATION: home exercise program, plan of care, activity modifications, pain management, and injury pathology       Goals: Set and discussed today  Active       PT Problem       STG       Start:  11/08/23    Expected End:  02/06/24       Patient will decrease pain to 0-2/10 in 4 weeks.   Patient will increase strength by >/= 1/2 mm grade in 4 weeks.  Oswestry: -6 in 4 weeks.             LTG       Start:  11/08/23    Expected End:  02/06/24       Patient will be able to walk >10 mins without increased pain in 8 weeks.  Patient will be able to ascend/descend stairs with step through pattern in 8 weeks.    Patient will increase LE flexibility to min tight in 8 weeks.   Patient will be able to maintain SLS >/= 10 seconds in 8 weeks.                Plan of care was developed with input and agreement by the patient      Treatment Performed:  Self-care: Patient educated on possible benefits of aquatic therapy since pain levels are too high to tolerate land PT at this time. Also discussed pain management options as she is meeting with pain med MD this week.      Charges: Low complexity  ravindra, Self care    Assessment: Patient presents with signs and symptoms consistent with LBP and lumbar radiculopathy, resulting in limited participation in pain-free ADLs and inability to perform at their prior level of function. Pt would benefit from physical therapy to address the impairments found & listed previously in the objective section in order to return to safe and pain-free ADLs and prior level of function. Patient going [place] so will discontinue POC when she leaves.       Plan:     Planned Interventions include: therapeutic exercise, self-care home management, manual therapy, therapeutic activities, gait training, neuromuscular coordination, vasopneumatic, dry needling, aquatic therapy  Frequency: 1 x Week  Duration: 8 Weeks    Carrie Choudhury, PT

## 2023-11-08 ENCOUNTER — EVALUATION (OUTPATIENT)
Dept: PHYSICAL THERAPY | Facility: CLINIC | Age: 88
End: 2023-11-08
Payer: MEDICARE

## 2023-11-08 DIAGNOSIS — M47.27 LUMBOSACRAL SPONDYLOSIS WITH RADICULOPATHY: ICD-10-CM

## 2023-11-08 DIAGNOSIS — M54.50 LOWER BACK PAIN: Primary | ICD-10-CM

## 2023-11-08 PROCEDURE — 97535 SELF CARE MNGMENT TRAINING: CPT | Mod: GP

## 2023-11-08 PROCEDURE — 97161 PT EVAL LOW COMPLEX 20 MIN: CPT | Mod: GP

## 2023-11-08 ASSESSMENT — PAIN SCALES - GENERAL: PAINLEVEL_OUTOF10: 5 - MODERATE PAIN

## 2023-11-08 ASSESSMENT — ENCOUNTER SYMPTOMS
DEPRESSION: 0
OCCASIONAL FEELINGS OF UNSTEADINESS: 0
LOSS OF SENSATION IN FEET: 1

## 2023-11-08 ASSESSMENT — PAIN - FUNCTIONAL ASSESSMENT: PAIN_FUNCTIONAL_ASSESSMENT: 0-10

## 2023-11-08 NOTE — LETTER
November 8, 2023     Patient: Annita Mcleod   YOB: 1937   Date of Visit: 11/8/2023       To Whom It May Concern:    It is my medical opinion that Annita Mcleod {Work release (duty restriction):46784}.    If you have any questions or concerns, please don't hesitate to call.         Sincerely,        Carrie Choudhury, PT    CC: No Recipients

## 2023-11-08 NOTE — LETTER
November 8, 2023     Patient: Annita Mcleod   YOB: 1937   Date of Visit: 11/8/2023       To Whom it May Concern:    Annita Mcleod was seen in my clinic on 11/8/2023. She {Return to school/sport:51993}.    If you have any questions or concerns, please don't hesitate to call.         Sincerely,          Carrie Choudhury, PT        CC: No Recipients

## 2023-11-09 ENCOUNTER — OFFICE VISIT (OUTPATIENT)
Dept: PAIN MEDICINE | Facility: CLINIC | Age: 88
End: 2023-11-09
Payer: MEDICARE

## 2023-11-09 VITALS
WEIGHT: 122 LBS | HEART RATE: 58 BPM | HEIGHT: 62 IN | BODY MASS INDEX: 22.45 KG/M2 | RESPIRATION RATE: 17 BRPM | SYSTOLIC BLOOD PRESSURE: 119 MMHG | DIASTOLIC BLOOD PRESSURE: 69 MMHG

## 2023-11-09 DIAGNOSIS — G89.29 CHRONIC BILATERAL LOW BACK PAIN WITHOUT SCIATICA: Primary | ICD-10-CM

## 2023-11-09 DIAGNOSIS — M54.50 CHRONIC BILATERAL LOW BACK PAIN WITHOUT SCIATICA: Primary | ICD-10-CM

## 2023-11-09 PROCEDURE — 99213 OFFICE O/P EST LOW 20 MIN: CPT | Performed by: PAIN MEDICINE

## 2023-11-09 PROCEDURE — 3078F DIAST BP <80 MM HG: CPT | Performed by: PAIN MEDICINE

## 2023-11-09 PROCEDURE — 1125F AMNT PAIN NOTED PAIN PRSNT: CPT | Performed by: PAIN MEDICINE

## 2023-11-09 PROCEDURE — 1036F TOBACCO NON-USER: CPT | Performed by: PAIN MEDICINE

## 2023-11-09 PROCEDURE — 1159F MED LIST DOCD IN RCRD: CPT | Performed by: PAIN MEDICINE

## 2023-11-09 PROCEDURE — 3074F SYST BP LT 130 MM HG: CPT | Performed by: PAIN MEDICINE

## 2023-11-09 PROCEDURE — 1160F RVW MEDS BY RX/DR IN RCRD: CPT | Performed by: PAIN MEDICINE

## 2023-11-09 SDOH — SOCIAL STABILITY: SOCIAL NETWORK: SOCIAL ACTIVITY:: 7

## 2023-11-09 ASSESSMENT — PAIN - FUNCTIONAL ASSESSMENT: PAIN_FUNCTIONAL_ASSESSMENT: 0-10

## 2023-11-09 ASSESSMENT — PAIN SCALES - GENERAL
PAINLEVEL_OUTOF10: 6
PAINLEVEL: 6

## 2023-11-09 NOTE — PROGRESS NOTES
C/o lower back, left more than right. At times numbness and tingling. Down the legs, pain is most of the time. Has a order for referral for MRI, started PT. Currently takes Advil and Tylenol, CBD cream.

## 2023-11-09 NOTE — PROGRESS NOTES
Subjective   Patient ID: Annita Mcleod is a 89 y.o. female who presents for Back Pain (C/o lower back pain).    HPI:   Patient presents to the clinic as a new patient for chronic low back pain. The pain has been present for multiple years. It is sharp pain that is localized to her low back and is greater on the left side than the right. The pain is intermittent, but she is in pain approximately 75% of the time. It worsens with prolonged sitting, bending, and twisting. Walking does not change her pain. She is often pain-free when she wakes up in the morning. Her pain mildly improves with tylenol and CBD cream. She denies any radiation to her lower extremities, numbness, or weakness. She does have burning in her bilateral lower extremities in a stocking-like distribution to her mid-calves for which she is scheduled to have an EMG next week. She will also be starting aqua therapy next week. She is scheduled for an MRI lumbar spine. Most recent MRI is from 2017 which demonstrates mild-moderate stenosis at L3-4 .       Review of Systems   13-point ROS done and negative except for HPI.       Current Outpatient Medications:     amLODIPine (Norvasc) 2.5 mg tablet, Take 1 tablet (2.5 mg) by mouth 2 times a day., Disp: , Rfl:     atorvastatin (Lipitor) 40 mg tablet, Take 1 tablet (40 mg) by mouth once daily at bedtime., Disp: , Rfl:     cholecalciferol (Vitamin D-3) 25 MCG (1000 UT) capsule, Take 1 capsule (25 mcg) by mouth once daily., Disp: , Rfl:     clopidogrel (Plavix) 75 mg tablet, Take 1 tablet (75 mg) by mouth once daily., Disp: 90 tablet, Rfl: 3    fluticasone (Flovent) 110 mcg/actuation inhaler, Inhale 2 puffs 2 times a day., Disp: , Rfl:     gabapentin (Neurontin) 300 mg capsule, Take 1 capsule (300 mg) by mouth 3 times a day., Disp: 270 capsule, Rfl: 1    levothyroxine (Synthroid, Levoxyl) 25 mcg tablet, Take 1 tablet (25 mcg) by mouth once daily., Disp: 90 tablet, Rfl: 3    losartan (Cozaar) 50 mg tablet, Take 1  tablet (50 mg) by mouth once daily., Disp: , Rfl:     metoprolol tartrate (Lopressor) 25 mg tablet, Take 0.25 tablets (6.25 mg) by mouth 2 times a day., Disp: , Rfl:     nitroglycerin (Nitrostat) 0.4 mg SL tablet, Place 1 tablet (0.4 mg) under the tongue every 5 minutes if needed for chest pain., Disp: , Rfl:     sertraline (Zoloft) 25 mg tablet, Take 0.5 tablets (12.5 mg) by mouth once daily., Disp: , Rfl:      Past Medical History:   Diagnosis Date    Abnormal coagulation profile 04/29/2019    Abnormal bleeding time    Other conditions influencing health status 02/24/2017    Dog bite of extremity    Pain in unspecified hip 05/24/2017    Joint pain, hip    Personal history of diseases of the blood and blood-forming organs and certain disorders involving the immune mechanism 04/29/2019    History of bleeding disorder    Personal history of other diseases of the musculoskeletal system and connective tissue 05/27/2014    History of muscle spasm    Personal history of other diseases of the musculoskeletal system and connective tissue 01/18/2017    History of low back pain    Personal history of other specified conditions 02/24/2017    History of abdominal pain        No past surgical history on file.     Family History   Problem Relation Name Age of Onset    Other (heart problem) Mother          No Known Allergies     Objective     Vitals:    11/09/23 1053   BP: 119/69   Pulse: 58   Resp: 17        Physical Exam      General: NAD, well groomed, well nourished  Eyes: Non-icteric sclera, EOMI  Ears, Nose, Mouth, and Throat: External ears and nose appear to be without deformity or rash. No lesions or masses noted. Hearing is grossly intact.   Neck: Trachea midline  Respiratory: Nonlabored breathing   Cardiovascular: No peripheral edema   Skin: No rashes or open lesions/ulcers identified on skin.    Back:   Palpation: Tenderness to palpation over lumbar paraspinous muscles, on the left.   Straight leg raise: Does not  reproduce their pain, bilaterally  SI Joint: No tenderness to palpation over SI joint, bilaterally. No pain with DARRELL test, bilaterally    Neurologic:   Cranial nerves grossly intact.   Strength: 5/5 and symmetric throughout.   Sensation: Normal to light touch throughout.    Psychiatric: Alert, orientation to person, place, and time. Cooperative.      Assessment/Plan   Annita Mcleod is a 89 year old female who presents with chronic low back pain. History and exam reveals etiology is likely musculoskeletal as she does not have any radicular symptoms and MRI shows only mild stenosis at L3-4. Will review her updated MRI once done to assess for interval worsening. She had point tenderness in her left paraspinal muscles. A trigger point injection was performed in the office. The risks, benefits, and alternatives of the procedure were discussed with the patient and she agreed to proceed. She had excellent relief following the trigger point injection. She can come back for additional trigger point injections as needed. She was encouraged to continue aqua therapy. A TENS unit may provide her with additional pain relief. The patient is interested in potentially pursuing acupuncture in the future, we can provide a referral if she decides to try it.     Plan:  - TENS unit   - Continue aqua therapy   - Repeat trigger point injections as needed    The patient was invited to contact us back anytime with any questions or concerns and follow-up with us in the office as needed.

## 2023-11-13 ENCOUNTER — APPOINTMENT (OUTPATIENT)
Dept: PHYSICAL THERAPY | Facility: CLINIC | Age: 88
End: 2023-11-13
Payer: MEDICARE

## 2023-11-14 ENCOUNTER — TREATMENT (OUTPATIENT)
Dept: PHYSICAL THERAPY | Facility: CLINIC | Age: 88
End: 2023-11-14
Payer: MEDICARE

## 2023-11-14 DIAGNOSIS — M54.50 CHRONIC BILATERAL LOW BACK PAIN WITHOUT SCIATICA: Primary | ICD-10-CM

## 2023-11-14 DIAGNOSIS — M54.16 ACUTE LUMBAR RADICULOPATHY: ICD-10-CM

## 2023-11-14 DIAGNOSIS — G89.29 CHRONIC BILATERAL LOW BACK PAIN WITHOUT SCIATICA: Primary | ICD-10-CM

## 2023-11-14 PROCEDURE — 97113 AQUATIC THERAPY/EXERCISES: CPT | Mod: GP,CQ | Performed by: SPECIALIST/TECHNOLOGIST

## 2023-11-14 ASSESSMENT — PAIN - FUNCTIONAL ASSESSMENT: PAIN_FUNCTIONAL_ASSESSMENT: 0-10

## 2023-11-14 ASSESSMENT — PAIN SCALES - GENERAL: PAINLEVEL_OUTOF10: 0 - NO PAIN

## 2023-11-14 NOTE — PROGRESS NOTES
Physical Therapy  Physical Therapy Treatment    Patient Name: Annita Mcleod  MRN: [id number]  Today's Date: 11/14/2023  Time Calculation  Start Time: 1045  Stop Time: 1133  Time Calculation (min): 48 min    Insurance:  Visit number: 2 of [facility]  Authorization info: No auth needed  Insurance Type: [facility] Medicare  Cert date start: 11/8/2023        Cert date end: 2/5/2024                General:  Reason for visit: LBP/Lumbar Radic  Referred by: RACHEL Perez      Current Problem  1. Chronic bilateral low back pain without sciatica        2. Acute lumbar radiculopathy              Precautions  STEADI Fall Risk Score (The score of 4 or more indicates an increased risk of falling): 4  Precautions Comment: none      Pain Assessment: 0-10  Pain Score: 0 - No pain    Subjective:   Patient reports having pain injection last week which has reduced pain.  Patient notes no pain on arrival although intermittent pain 8-9 of 10  HEP Performed:  Yes    Objective:   Slightly forward posture    Treatments:   Pool Depth: 3 foot, Temperature 92°  Forward walk 3'   Side Step 4'   HR/TR 2'  Hip ext R/L 2' each   Hip abd/add R/L 2' each   Floats: small blue Shoulder extension R/L - 2' wide base 2' narrow base  Noodle bicycle 4', march 2'   Noodle 5 foot deep hang 3'     Charges: AQ x3 (cq)     Assessment: Patient tolerated intensity noting mild back pain with hip abd eliminated by unweighting.  Patient noted mild soreness < 1 of 10 post treatment.      Plan: Continue aquatic therapy to improve core stability/flexibility to improve ADL     Constantino Potter, PTA

## 2023-11-16 ENCOUNTER — TELEPHONE (OUTPATIENT)
Dept: PHARMACY | Facility: HOSPITAL | Age: 88
End: 2023-11-16
Payer: MEDICARE

## 2023-11-16 ENCOUNTER — APPOINTMENT (OUTPATIENT)
Dept: PAIN MEDICINE | Facility: HOSPITAL | Age: 88
End: 2023-11-16
Payer: MEDICARE

## 2023-11-16 NOTE — TELEPHONE ENCOUNTER
Population Health: Outreach by Ambulatory Pharmacy Team    Payor: [employer]  Reason: Adherence  Medication: LOSARTAN POT TAB 50MG  Outcome: Left Voicemail. Last filled on 6/24/23 for 90 days with no refills at [facility] pharmacy and is past due. Prescription was from Dr. Aceves from [facility] but patient seems to be seeing a  PCP now.    Moira Song, PharmD

## 2023-11-17 ENCOUNTER — HOSPITAL ENCOUNTER (OUTPATIENT)
Dept: NEUROLOGY | Facility: CLINIC | Age: 88
Discharge: HOME | End: 2023-11-17
Payer: MEDICARE

## 2023-11-17 DIAGNOSIS — R20.2 PARESTHESIA OF FOOT, BILATERAL: ICD-10-CM

## 2023-11-17 PROCEDURE — 95910 NRV CNDJ TEST 7-8 STUDIES: CPT | Performed by: PSYCHIATRY & NEUROLOGY

## 2023-11-17 PROCEDURE — 95886 MUSC TEST DONE W/N TEST COMP: CPT | Performed by: PSYCHIATRY & NEUROLOGY

## 2023-11-19 ENCOUNTER — HOSPITAL ENCOUNTER (OUTPATIENT)
Dept: RADIOLOGY | Facility: HOSPITAL | Age: 88
Discharge: HOME | End: 2023-11-19
Payer: MEDICARE

## 2023-11-19 DIAGNOSIS — M47.27 LUMBOSACRAL SPONDYLOSIS WITH RADICULOPATHY: ICD-10-CM

## 2023-11-19 PROCEDURE — 72148 MRI LUMBAR SPINE W/O DYE: CPT

## 2023-11-19 PROCEDURE — 72148 MRI LUMBAR SPINE W/O DYE: CPT | Performed by: RADIOLOGY

## 2023-11-21 ENCOUNTER — TREATMENT (OUTPATIENT)
Dept: PHYSICAL THERAPY | Facility: CLINIC | Age: 88
End: 2023-11-21
Payer: MEDICARE

## 2023-11-21 DIAGNOSIS — M54.16 ACUTE LUMBAR RADICULOPATHY: Primary | ICD-10-CM

## 2023-11-21 DIAGNOSIS — M47.27 LUMBOSACRAL SPONDYLOSIS WITH RADICULOPATHY: ICD-10-CM

## 2023-11-21 DIAGNOSIS — M54.50 CHRONIC BILATERAL LOW BACK PAIN WITHOUT SCIATICA: ICD-10-CM

## 2023-11-21 DIAGNOSIS — G89.29 CHRONIC BILATERAL LOW BACK PAIN WITHOUT SCIATICA: ICD-10-CM

## 2023-11-21 PROCEDURE — 97113 AQUATIC THERAPY/EXERCISES: CPT | Mod: GP,CQ | Performed by: SPECIALIST/TECHNOLOGIST

## 2023-11-21 ASSESSMENT — PAIN - FUNCTIONAL ASSESSMENT: PAIN_FUNCTIONAL_ASSESSMENT: 0-10

## 2023-11-21 NOTE — PROGRESS NOTES
Physical Therapy  Physical Therapy Treatment    Patient Name: Annita Mcleod  MRN: [id number]  Today's Date: 11/21/2023  Time Calculation  Start Time: 1130  Stop Time: 1210  Time Calculation (min): 40 min    Insurance:  Visit number: 3 of [facility]  Authorization info: No auth needed  Insurance Type: [facility] Medicare  Cert date start: 11/8/2023        Cert date end: 2/5/2024                General:  Reason for visit: LBP/Lumbar Radic  Referred by: RACHEL Perez      Current Problem  1. Acute lumbar radiculopathy        2. Lumbosacral spondylosis with radiculopathy  Follow Up In Physical Therapy      3. Chronic bilateral low back pain without sciatica            Precautions  STEADI Fall Risk Score (The score of 4 or more indicates an increased risk of falling): 4  Precautions Comment: none      Pain Assessment: 0-10    Subjective:   Patient reports having low level pain on arrival.  Patient feels aquatic session has helped.      HEP Performed:  Yes    Objective:   Slightly forward posture    Treatments:   Pool Depth: 3 foot, Temperature 92°  Forward/retro walk 2' each   Side Step 4'   HR/TR 2'  Floats: small blue Shoulder extension R/L - 2' wide base 2' horiz abd   Noodle bicycle 4', hams with hips at 90 degrees   Noodle Hip abd/add R/L 2' each with knees bent (no back pain), hip circles 2'  Noodle 5 foot deep hang 3'     Charges: AQ x3 ()     Access Code: [email or web address]  URL: [email or web address]  Date: 11/21/2023  Prepared by: Constantino Potter    Exercises  - Water walking Fwd and Bkwd  - 2-3 x weekly - 5' hold  - Lateral side step walking  - 2-3 x weekly - 4' hold  - March in Place  - 2-3 x weekly - 4' hold  - Alternating leg lifts out to side  - 2-3 x weekly - 4' hold  - Hamstring curls   - 2-3 x weekly - 4' hold  - Squat  - 2-3 x weekly - 2' hold  - Standing Hip Circles at Pool Wall  - 2-3 x weekly - 2' hold  - Hamstring stretch at pool wall or on pool step  - 2-3 x weekly - 1' hold    Assessment: Patient  tolerated intensity noting mild back pain with forward walking and heel raise, decreased with retro walking and side stepping or noodle floating exercise.   Patient noted no soreness after treatment today.  Patient notes pain improving with aquatic session.     Plan: Continue aquatic therapy to improve core stability/flexibility to improve ADL     Constantino Potter, PTA

## 2023-11-28 ENCOUNTER — TREATMENT (OUTPATIENT)
Dept: PHYSICAL THERAPY | Facility: CLINIC | Age: 88
End: 2023-11-28
Payer: MEDICARE

## 2023-11-28 DIAGNOSIS — M47.27 LUMBOSACRAL SPONDYLOSIS WITH RADICULOPATHY: ICD-10-CM

## 2023-11-28 PROCEDURE — 97113 AQUATIC THERAPY/EXERCISES: CPT | Mod: GP,CQ | Performed by: SPECIALIST/TECHNOLOGIST

## 2023-11-28 ASSESSMENT — PAIN - FUNCTIONAL ASSESSMENT: PAIN_FUNCTIONAL_ASSESSMENT: 0-10

## 2023-11-28 NOTE — PROGRESS NOTES
Physical Therapy  Physical Therapy Treatment    Patient Name: Annita Mcleod  MRN: [id number]  Today's Date: 11/28/2023  Time Calculation  Start Time: 1003  Stop Time: 1043  Time Calculation (min): 40 min    Insurance:  Visit number: 3 of [facility]  Authorization info: No auth needed  Insurance Type: [facility] Medicare  Cert date start: 11/8/2023        Cert date end: 2/5/2024                General:  Reason for visit: LBP/Lumbar Radic  Referred by: RACHEL Peerz      Current Problem  1. Lumbosacral spondylosis with radiculopathy  Follow Up In Physical Therapy          Precautions  STEADI Fall Risk Score (The score of 4 or more indicates an increased risk of falling): 4  Precautions Comment: none      Pain Assessment: 0-10    Subjective:   Patient reports having low level pain on arrival.  Patient feels aquatic session has helped.      HEP Performed:  Yes    Objective:   Slightly forward posture    Treatments:   Pool Depth: 3 foot, Temperature 92°  Forward/retro walk 2' each   Side Step 4'   HR/TR 2'  Floats: small blue Shoulder extension R/L - 2' wide base 2' horiz abd   Noodle bicycle 4',   Noodle hams with hips at 90 degrees 2'   Noodle Hip abd/add R/L 2' each with knees bent (no back pain),   Noodle hip circles R/L 1'  Noodle 5 foot deep hang 3'     Charges: AQ x3 ()     Access Code: [email or web address]  URL: [email or web address]  Date: 11/21/2023  Prepared by: Constantino Potter    Exercises  - Water walking Fwd and Bkwd  - 2-3 x weekly - 5' hold  - Lateral side step walking  - 2-3 x weekly - 4' hold  - March in Place  - 2-3 x weekly - 4' hold  - Alternating leg lifts out to side  - 2-3 x weekly - 4' hold  - Hamstring curls   - 2-3 x weekly - 4' hold  - Squat  - 2-3 x weekly - 2' hold  - Standing Hip Circles at Pool Wall  - 2-3 x weekly - 2' hold  - Hamstring stretch at pool wall or on pool step  - 2-3 x weekly - 1' hold    Assessment: Patient tolerated intensity noting with mild soreness.  Patient generally  feeling better with aquatic therapy.      Plan: Patient leaves for [place] on Monday and returns in February 2024.      Constantino Potter, PTA

## 2023-11-30 ENCOUNTER — APPOINTMENT (OUTPATIENT)
Dept: PHYSICAL THERAPY | Facility: CLINIC | Age: 88
End: 2023-11-30
Payer: MEDICARE

## 2023-12-04 ENCOUNTER — APPOINTMENT (OUTPATIENT)
Dept: PHYSICAL THERAPY | Facility: CLINIC | Age: 88
End: 2023-12-04
Payer: MEDICARE

## 2024-01-10 DIAGNOSIS — I10 PRIMARY HYPERTENSION: Primary | ICD-10-CM

## 2024-01-10 RX ORDER — LOSARTAN POTASSIUM 50 MG/1
50 TABLET ORAL DAILY
Qty: 90 TABLET | Refills: 2 | Status: SHIPPED | OUTPATIENT
Start: 2024-01-10

## 2024-02-23 ENCOUNTER — TELEPHONE (OUTPATIENT)
Dept: PRIMARY CARE | Facility: CLINIC | Age: 89
End: 2024-02-23

## 2024-03-06 DIAGNOSIS — M47.27 LUMBOSACRAL SPONDYLOSIS WITH RADICULOPATHY: Primary | ICD-10-CM

## 2024-03-13 ENCOUNTER — DOCUMENTATION (OUTPATIENT)
Dept: PHYSICAL THERAPY | Facility: CLINIC | Age: 89
End: 2024-03-13
Payer: MEDICARE

## 2024-03-13 NOTE — PROGRESS NOTES
Physical Therapy    Discharge Summary    Name: Annita Mcleod  MRN: [id number]  : 6/10/1932  Date: 24    Discharge Summary: PT    Discharge Information: Date of discharge 3/13/24, Date of last visit 23, Date of evaluation 23, Number of attended visits 4, Referred by Dr. Perez, and Referred for LBP, lumbar radiculopathy    Therapy Summary: At last visit patient was having low pain level during aquatic PT.    Discharge Status: Unable to assess because she did not return after her winter in [place].     Rehab Discharge Reason: Failed to schedule and/or keep follow-up appointment(s)

## 2024-04-04 ENCOUNTER — APPOINTMENT (OUTPATIENT)
Dept: PRIMARY CARE | Facility: CLINIC | Age: 89
End: 2024-04-04
Payer: MEDICARE

## 2024-04-08 NOTE — PROGRESS NOTES
Physical Therapy  Physical Therapy Orthopedic Evaluation    Patient Name: Annita Mcleod  MRN: [id number]  Today's Date: 4/9/2024  Time Calculation  Start Time: 1330  Stop Time: 1415  Time Calculation (min): 45 min    Insurance:  Visit number: 1 of [place]  Authorization info: no auth needed  Insurance Type: [facility] Medicare  Cert date start: 4/9/24        Cert date end: 7/8/24                General:  Reason for visit: LBP and lumbar radiculopathy  Referred by: Dr. Desiree Preez    Current Problem  1. Lumbar radiculopathy  Follow Up In Physical Therapy      2. Lumbosacral spondylosis with radiculopathy  Referral to Physical Therapy          Precautions  STEADI Fall Risk Score (The score of 4 or more indicates an increased risk of falling): 5    Medical History Form: Reviewed (scanned into chart)    Subjective:     Chief Complaint: Patient presents to clinic with LBP and lumbar radiculopathy L>R with h/o DDD. Has not tried epidural injections. Stopped taking gabapentin because it was not helping. Exercise in the water is all she can tolerate at this time.  Onset Date: 1/1/21  TREVOR: got worse after getting COVID    Current Condition:   Worse    Pain:  Pain Assessment: 0-10  Pain Score: 2  Location: across low back and over to left, pain from shin down to feet   Description: sharp  Aggravating Factors: sitting  Relieving Factors: Tylenol, lidocaine patch, heating pad     Relevant Information (PMH & Previous Tests/Imaging): having new MRI in a week.  Previous Interventions/Treatments: None    Prior Level of Function (PLOF)  Patient previously independent with all ADLs  Exercise/Physical Activity: none  Work/School: retired     Patients Living Environment: Reviewed and no concern  Stairs: no stairs    Primary Language: English    Patient's Goal(s) for Therapy: Decrease pain and increase quality of life    Red Flags: Do you have any of the following? No  Fever/chills, unexplained weight changes, dizziness/fainting, unexplained  change in bowel or bladder functions, unexplained malaise or muscle weakness, night pain/sweats, numbness or tingling    Objective:    Gait: decreased lavell      Balance: SLS-     R: 2 sec.           L: 2 sec.    Palpation: TTP across low back and left glute    Flexibility: mod tight B HS, quad, HF, gastroc    ROM    Lumbar AROM (%)    Flexion: 50% P  Extension: 10% P  (L) Side Bend: 10% P  (R) Side Bend: 10% P  (L) Rotation: 75% P  (R) Rotation: 50% P      Hip AROM (Degrees): R=L       Strength Testing (pain with all testing)    LE strength MMT  R L  Hip flexion  4/5 4/5  Hip extension  2+/5 2+/5  Hip abduction  3+/5 3+/5  Hip adduction  3-/5 3-/5  Hip IR   3+/5 3+/5  Hip ER   3+/5 3+/5  Knee extension 4-/5 4-/5  Knee flexion  4/5 4-/5          Special Tests    Slump Test: positive B    Radicular Symptoms: L>R from shin down to foot      Outcome Measures:  Other Measures  Oswestry Disablity Index (BLANCHE): 33/50     EDUCATION: home exercise program, plan of care, activity modifications, pain management, and injury pathology       Goals: Set and discussed today  Active       PT Problem       STG       Start:  04/09/24    Expected End:  07/08/24       Patient will decrease pain to 0-2/10 in 4 weeks.   Patient will increase strength by >/= 1/2 mm grade in 4 weeks.  Oswestry: -6 in 4 weeks.               LTG       Start:  04/09/24    Expected End:  07/08/24       Patient will be able to walk >10 mins without increased pain in 8 weeks.  Patient will be able to ascend/descend stairs with step through pattern in 8 weeks.    Patient will increase LE flexibility to min tight in 8 weeks.   Patient will be able to maintain SLS >/= 10 seconds in 8 weeks.                Plan of care was developed with input and agreement by the patient      Treatment Performed:  Self-care: Patient educated on how our aquatic therapy program works. Also discussed pain management options to discuss with either PCP or pain management doctor.      Access Code: [email or web address]      Exercises  - Supine Lower Trunk Rotation  - 2 x daily - 7 x weekly  - Supine Piriformis Stretch with Foot on Ground  - 2 x daily - 7 x weekly - 3 reps - 30 hold  - Supine Hamstring Stretch   - 2 x daily - 7 x weekly - 3 reps - 30 hold  - Supine Transversus Abdominis Bracing - Hands on Stomach  - 2 x daily - 7 x weekly - 2 sets - 10 reps - 5 hold    Charges: Low complexity eval, Self care, TE    Assessment: Patient presents with signs and symptoms consistent with LBP and lumbar radiculopathy, resulting in limited participation in pain-free ADLs and inability to perform at their prior level of function. Pt would benefit from physical therapy to address the impairments found & listed previously in the objective section in order to return to safe and pain-free ADLs and prior level of function. Patient going out of [place] country in [place] so will discontinue POC when she leaves.       Plan:     Planned Interventions include: therapeutic exercise, self-care home management, manual therapy, therapeutic activities, gait training, neuromuscular coordination, vasopneumatic, dry needling, aquatic therapy  Frequency: 1 x Week  Duration: 8 Weeks    Carrie Choudhury, PT

## 2024-04-09 ENCOUNTER — EVALUATION (OUTPATIENT)
Dept: PHYSICAL THERAPY | Facility: CLINIC | Age: 89
End: 2024-04-09
Payer: MEDICARE

## 2024-04-09 DIAGNOSIS — M54.16 LUMBAR RADICULOPATHY: Primary | ICD-10-CM

## 2024-04-09 DIAGNOSIS — M47.27 LUMBOSACRAL SPONDYLOSIS WITH RADICULOPATHY: ICD-10-CM

## 2024-04-09 PROCEDURE — 97535 SELF CARE MNGMENT TRAINING: CPT | Mod: GP

## 2024-04-09 PROCEDURE — 97161 PT EVAL LOW COMPLEX 20 MIN: CPT | Mod: GP

## 2024-04-09 PROCEDURE — 97110 THERAPEUTIC EXERCISES: CPT | Mod: GP

## 2024-04-09 ASSESSMENT — PAIN SCALES - GENERAL: PAINLEVEL_OUTOF10: 2

## 2024-04-09 ASSESSMENT — ENCOUNTER SYMPTOMS
LOSS OF SENSATION IN FEET: 1
OCCASIONAL FEELINGS OF UNSTEADINESS: 1
DEPRESSION: 0

## 2024-04-09 ASSESSMENT — PAIN - FUNCTIONAL ASSESSMENT: PAIN_FUNCTIONAL_ASSESSMENT: 0-10

## 2024-04-11 ENCOUNTER — APPOINTMENT (OUTPATIENT)
Dept: PRIMARY CARE | Facility: CLINIC | Age: 89
End: 2024-04-11
Payer: MEDICARE

## 2024-04-15 ENCOUNTER — APPOINTMENT (OUTPATIENT)
Dept: PHYSICAL THERAPY | Facility: CLINIC | Age: 89
End: 2024-04-15
Payer: MEDICARE

## 2024-04-22 ENCOUNTER — APPOINTMENT (OUTPATIENT)
Dept: PHYSICAL THERAPY | Facility: CLINIC | Age: 89
End: 2024-04-22
Payer: MEDICARE

## 2024-05-20 ENCOUNTER — APPOINTMENT (OUTPATIENT)
Dept: PHYSICAL THERAPY | Facility: CLINIC | Age: 89
End: 2024-05-20
Payer: MEDICARE

## 2024-05-24 ENCOUNTER — APPOINTMENT (OUTPATIENT)
Dept: PHYSICAL THERAPY | Facility: CLINIC | Age: 89
End: 2024-05-24
Payer: MEDICARE

## 2024-05-28 ENCOUNTER — PATIENT OUTREACH (OUTPATIENT)
Dept: PRIMARY CARE | Facility: CLINIC | Age: 89
End: 2024-05-28
Payer: MEDICARE

## 2024-05-29 ENCOUNTER — APPOINTMENT (OUTPATIENT)
Dept: PHYSICAL THERAPY | Facility: CLINIC | Age: 89
End: 2024-05-29
Payer: MEDICARE

## 2024-05-29 NOTE — PROGRESS NOTES
Discharge Facility:[facility]  Discharge Diagnosis:Acute intractable headache, unspecified headache type   Admission Date:5/24/24  Discharge Date: 5/25/24    PCP Appointment Date:6/3/24  Specialist Appointment Date: N/A  Hospital Encounter and Summary: Linked     See discharge assessment below for further details  Two attempts were made to reach patient within two business days after discharge. Voicemail left with contact information for patient to call back with any non-emergent questions or concerns.    Jeni Davis LPN

## 2024-05-31 ENCOUNTER — TREATMENT (OUTPATIENT)
Dept: PHYSICAL THERAPY | Facility: CLINIC | Age: 89
End: 2024-05-31
Payer: MEDICARE

## 2024-05-31 DIAGNOSIS — M54.16 LUMBAR RADICULOPATHY: ICD-10-CM

## 2024-05-31 PROCEDURE — 97113 AQUATIC THERAPY/EXERCISES: CPT | Mod: GP,CQ | Performed by: SPECIALIST/TECHNOLOGIST

## 2024-05-31 ASSESSMENT — PAIN - FUNCTIONAL ASSESSMENT: PAIN_FUNCTIONAL_ASSESSMENT: 0-10

## 2024-05-31 ASSESSMENT — PAIN SCALES - GENERAL: PAINLEVEL_OUTOF10: 6

## 2024-05-31 NOTE — PROGRESS NOTES
Physical Therapy  Physical Therapy Treatment    Patient Name: Annita Mcleod  MRN: [id number]  Today's Date: 5/31/2024  Time Calculation  Start Time: 1000  Stop Time: 1048  Time Calculation (min): 48 min    Insurance:  Visit number: 2 of [facility]  Authorization info: No Auth Needed  Insurance Type: [facility] Medicare  Cert date start: 4/9/24        Cert date end: 7/8/24                General       Current Problem  1. Lumbar radiculopathy  Follow Up In Physical Therapy          Precautions  STEADI Fall Risk Score (The score of 4 or more indicates an increased risk of falling): 5    Pain Assessment: 0-10  Pain Score: 6    Subjective:   Patient reports recovering from Cardiac cath 1 week ago (cleared to return by cardiology since investigative without any positive findings).   HEP Performed:  Yes    Objective:   Slightly forward posture     Treatments:   Pool Depth: 4 foot, Temperature 92°  Forward/retro walk 4'   Side Step 4'   HR/TR 2'     5 foot work white noodle  Hang 4'   Bicycle 4'   Alt Hams 4'  Hip ext R/L 2' each   Hip abd/add R/L 2' each     4' deep  Standing HF stretch R/L 1'  Standing Hams stretch on stair R/L 1'     Charges: AQ x3 (cq)     Assessment: Patient noted feeling tired even though exercise felt really easy and she had not done much work.      Plan: continue aquatic sessions to improve posture and core strength to improve ADL.      Constantino Potter, PTA

## 2024-06-03 ENCOUNTER — OFFICE VISIT (OUTPATIENT)
Dept: PRIMARY CARE | Facility: CLINIC | Age: 89
End: 2024-06-03
Payer: MEDICARE

## 2024-06-03 ENCOUNTER — APPOINTMENT (OUTPATIENT)
Dept: PHYSICAL THERAPY | Facility: CLINIC | Age: 89
End: 2024-06-03
Payer: MEDICARE

## 2024-06-03 ENCOUNTER — HOSPITAL ENCOUNTER (OUTPATIENT)
Dept: RADIOLOGY | Facility: CLINIC | Age: 89
Discharge: HOME | End: 2024-06-03
Payer: MEDICARE

## 2024-06-03 VITALS
DIASTOLIC BLOOD PRESSURE: 69 MMHG | SYSTOLIC BLOOD PRESSURE: 146 MMHG | OXYGEN SATURATION: 98 % | WEIGHT: 123.2 LBS | HEIGHT: 62 IN | HEART RATE: 67 BPM | BODY MASS INDEX: 22.67 KG/M2

## 2024-06-03 DIAGNOSIS — R06.09 DOE (DYSPNEA ON EXERTION): Primary | ICD-10-CM

## 2024-06-03 DIAGNOSIS — R06.09 DOE (DYSPNEA ON EXERTION): ICD-10-CM

## 2024-06-03 PROBLEM — M06.9 RHEUMATOID ARTHRITIS (MULTI): Status: RESOLVED | Noted: 2023-02-15 | Resolved: 2024-06-03

## 2024-06-03 PROCEDURE — 71046 X-RAY EXAM CHEST 2 VIEWS: CPT | Performed by: RADIOLOGY

## 2024-06-03 PROCEDURE — 71046 X-RAY EXAM CHEST 2 VIEWS: CPT

## 2024-06-03 PROCEDURE — 1036F TOBACCO NON-USER: CPT | Performed by: STUDENT IN AN ORGANIZED HEALTH CARE EDUCATION/TRAINING PROGRAM

## 2024-06-03 PROCEDURE — 1159F MED LIST DOCD IN RCRD: CPT | Performed by: STUDENT IN AN ORGANIZED HEALTH CARE EDUCATION/TRAINING PROGRAM

## 2024-06-03 PROCEDURE — 3077F SYST BP >= 140 MM HG: CPT | Performed by: STUDENT IN AN ORGANIZED HEALTH CARE EDUCATION/TRAINING PROGRAM

## 2024-06-03 PROCEDURE — 3078F DIAST BP <80 MM HG: CPT | Performed by: STUDENT IN AN ORGANIZED HEALTH CARE EDUCATION/TRAINING PROGRAM

## 2024-06-03 PROCEDURE — 99214 OFFICE O/P EST MOD 30 MIN: CPT | Performed by: STUDENT IN AN ORGANIZED HEALTH CARE EDUCATION/TRAINING PROGRAM

## 2024-06-03 PROCEDURE — 1160F RVW MEDS BY RX/DR IN RCRD: CPT | Performed by: STUDENT IN AN ORGANIZED HEALTH CARE EDUCATION/TRAINING PROGRAM

## 2024-06-03 RX ORDER — ALBUTEROL SULFATE 90 UG/1
2 AEROSOL, METERED RESPIRATORY (INHALATION) EVERY 6 HOURS PRN
Qty: 18 G | Refills: 11 | Status: SHIPPED | OUTPATIENT
Start: 2024-06-03 | End: 2025-06-03

## 2024-06-03 NOTE — PROGRESS NOTES
"Subjective   Patient ID: Annita Mcleod is a 89 y.o. female who presents for Follow-up (Discuss chest x-ray and referral for pulmonary. ).        HPI    She is here with her daughter , who is the historian   Daughter reports SOB with exertion , went to her cArdiologist , who advised observation   Had a family member who was an internist reportedly advised heart cath, given her prior h/o CABG  She reportedly had heart cath this past week at [facility] and was told abt clean CA    Now the family member would like to get her lungs checked   SOB is intermittent   Known h/o asthma   No pedal edema   Uses 2 pillows  when she lies down   Echo scheduled for 6/10     During this visit , she reports that she can't get a good breath with her mouth closed   Her pox is 98@ on Ra   No increased WOB or increased RR noted     Says she can't breathe through her nose   No known seasonal allergies                   Visit Vitals  /69   Pulse 67   Ht 1.575 m (5' 2\")   Wt 55.9 kg (123 lb 3.2 oz)   SpO2 98%   BMI 22.53 kg/m²   Smoking Status Never   BSA 1.56 m²      No LMP recorded.   Current Outpatient Medications   Medication Instructions    albuterol 90 mcg/actuation inhaler 2 puffs, inhalation, Every 6 hours PRN    amLODIPine (NORVASC) 2.5 mg, oral, 2 times daily    atorvastatin (Lipitor) 40 mg tablet 1 tablet, oral, Nightly    cholecalciferol (VITAMIN D-3) 25 mcg, oral, Daily    clopidogrel (PLAVIX) 75 mg, oral, Daily    fluticasone (Flovent) 110 mcg/actuation inhaler 2 puffs, inhalation, 2 times daily RT    gabapentin (NEURONTIN) 300 mg, oral, 3 times daily    levothyroxine (SYNTHROID, LEVOXYL) 25 mcg, oral, Daily    losartan (COZAAR) 50 mg, oral, Daily    metoprolol tartrate (LOPRESSOR) 6.25 mg, oral, 2 times daily    nitroglycerin (NITROSTAT) 0.4 mg, sublingual, Every 5 min PRN    sertraline (Zoloft) 25 mg tablet 0.5 tablets, oral, Daily      Social History     Tobacco Use    Smoking status: Never    Smokeless tobacco: Never "   Substance Use Topics    Alcohol use: Not Currently        Review of Systems    Constitutional : No feeling poorly / fevers/ chills / night sweats/ fatigue   Cardiovascular : No CP /Palpitations/ lower extremity edema / syncope   Respiratory :  As noted in HPI   Gastrointestinal : No abd pain / N/V  No bloody stools/ melena / constipation  Endo : No polyuria/polydipsia/ muscle weakness / sluggishness   CNS: No confusion / HA/ tingling/ numbness/ weakness of extremities  Psychiatric: No anxiety/ depression/ SI/HI    All other systems have been reviewed and are negative for complaint       Physical Exam    Constitutional : Vitals reviewed. Alert and in no distress  Cardiovascular : RRR, Normal S1, S2, No pericardial rub/ gallop, no peripheral edema   Pulmonary: No respiratory distress, CTAB   MSK : Normal gait and station , strength and tone   Skin: Warm to touch ,  normal skin turgor   Neurologic : CNs 2-12 grossly intact , no obvious FNDs  Psych : A,Ox3, normal mood and affect      Assessment/Plan   Diagnoses and all orders for this visit:  GODOY (dyspnea on exertion)  -     XR chest 2 views; Future  -     Complete Pulmonary Function Test Pre/Post Bronchodialator (Spirometry Pre/Post/DLCO/Lung Volumes); Future  -     Referral to Pulmonology; Future  -     albuterol 90 mcg/actuation inhaler; Inhale 2 puffs every 6 hours if needed for wheezing.          89 y/o female with CAD s/p 2005 with MI in 2021 s/p PCI , mild - moderate depression , hypothyroidism and paresthesia of LE      Right nostril turbinate edema > left, advised flonase   Even though she reported SOB during the visit  Pox on RA was reassuring , no increase RR/ WOB breathing noted     PHTN is a possibility as well , echo scheduled at [facility] for 6/10   Can't acess heart cath report   ? PA pressures      Intermittent nature of Dyspnea is puzzling , could be non exertional , no orthopnea     HRCT in 2021 , no pulm fibrosis noted  Denied any prior h/o RA     Known  h/o asthma , on Flovent ? Needs optimization of rx   Xray chest   PFTS   Pulm referral     No anemia noted on recent labs       Conditions addressed and mgmt as noted above.  Pertinent labs, images/ imaging reports , chart review was done .   Age appropriate labs / labs for mgmt of chronic medical conditions ordered, further mgmt pending the results.

## 2024-06-04 ENCOUNTER — TELEPHONE (OUTPATIENT)
Dept: PRIMARY CARE | Facility: CLINIC | Age: 89
End: 2024-06-04
Payer: MEDICARE

## 2024-06-04 RX ORDER — RANOLAZINE 500 MG/1
500 TABLET, EXTENDED RELEASE ORAL 2 TIMES DAILY
COMMUNITY

## 2024-06-05 ENCOUNTER — TREATMENT (OUTPATIENT)
Dept: PHYSICAL THERAPY | Facility: CLINIC | Age: 89
End: 2024-06-05
Payer: MEDICARE

## 2024-06-05 DIAGNOSIS — M54.16 LUMBAR RADICULOPATHY: ICD-10-CM

## 2024-06-05 PROCEDURE — 97113 AQUATIC THERAPY/EXERCISES: CPT | Mod: GP,CQ | Performed by: SPECIALIST/TECHNOLOGIST

## 2024-06-05 ASSESSMENT — PAIN SCALES - GENERAL: PAINLEVEL_OUTOF10: 2

## 2024-06-05 ASSESSMENT — PAIN - FUNCTIONAL ASSESSMENT: PAIN_FUNCTIONAL_ASSESSMENT: 0-10

## 2024-06-05 NOTE — PROGRESS NOTES
Physical Therapy  Physical Therapy Treatment    Patient Name: Annita Mcleod  MRN: [id number]  Today's Date: 6/5/2024       Insurance:  Visit number: 2 of [employer]  Authorization info: No Auth Needed  Insurance Type: [facility] Medicare  Cert date start: 4/9/24        Cert date end: 7/8/24                General       Current Problem  1. Lumbar radiculopathy  Follow Up In Physical Therapy          Precautions  STEADI Fall Risk Score (The score of 4 or more indicates an increased risk of falling): 5    Pain Assessment: 0-10  Pain Score: 2    Subjective:   Patient reports feeling better with minimal back pain.    HEP Performed:  Yes    Objective:   Slightly forward posture     Treatments:   Pool Depth: 4 foot, Temperature 92°  Forward/retro walk 4'   Side Step 4'   HR/TR 2'     5 foot work white noodle  Bicycle 4'   Alt Hams 4'  Hip ext R/L 2' each   Hip abd/add R/L 2' each   Leg circles cw/ccw 2' each   Hang 4'     4' deep  Standing HF stretch R/L 1'  Standing Hams stretch on stair R/L 1'     Charges: AQ x3 (cq)     Assessment: Patient noted feeling tired even with pool exercises feeling easy.  Patient did note feeling muscular effort in back during aquatic exercise.   Plan: continue aquatic sessions to improve posture and core strength to improve ADL.      Constantino Potter, PTA

## 2024-06-11 ENCOUNTER — APPOINTMENT (OUTPATIENT)
Dept: PHYSICAL THERAPY | Facility: CLINIC | Age: 89
End: 2024-06-11
Payer: MEDICARE

## 2024-06-12 ENCOUNTER — TREATMENT (OUTPATIENT)
Dept: PHYSICAL THERAPY | Facility: CLINIC | Age: 89
End: 2024-06-12
Payer: MEDICARE

## 2024-06-12 DIAGNOSIS — M54.16 LUMBAR RADICULOPATHY: ICD-10-CM

## 2024-06-12 PROCEDURE — 97113 AQUATIC THERAPY/EXERCISES: CPT | Mod: GP,CQ | Performed by: SPECIALIST/TECHNOLOGIST

## 2024-06-12 ASSESSMENT — PAIN - FUNCTIONAL ASSESSMENT: PAIN_FUNCTIONAL_ASSESSMENT: 0-10

## 2024-06-12 ASSESSMENT — PAIN SCALES - GENERAL: PAINLEVEL_OUTOF10: 0 - NO PAIN

## 2024-06-12 NOTE — PROGRESS NOTES
Physical Therapy  Physical Therapy Treatment    Patient Name: Annita Mcleod  MRN: [id number]  Today's Date: 6/12/2024  Time Calculation  Start Time: 1109  Stop Time: 1155  Time Calculation (min): 46 min    Insurance:  Visit number: 4 of [employer]  Authorization info: No Auth Needed  Insurance Type: [facility] Medicare  Cert date start: 4/9/24        Cert date end: 7/8/24                General  Reason for Referral: lumbar radic  Referred By: RACHEL Perez MD    Current Problem  1. Lumbar radiculopathy  Follow Up In Physical Therapy          Precautions  STEADI Fall Risk Score (The score of 4 or more indicates an increased risk of falling): 5  Precautions Comment: none    Pain Assessment: 0-10  Pain Score: 0 - No pain    Subjective:   Patient reports feeling much better with fewer episodes of pain (now about twice a week decreased from 5-6 days a week when she started) and when they do occur she recovers faster.    HEP Performed:  Yes    Objective:   Slightly forward posture     Treatments:   Pool Depth: 4 foot, Temperature 92°  Forward/retro walk 5'   Side Step 4'   HR/TR 2'     5 foot work white noodle  Bicycle 5'   Alt Hams 4'  Hip ext R/L 2.5' each   Hip abd/add R/L 2.5' each   Leg circles cw/ccw 2' each   Hang 4'     4' deep  Standing HF stretch R/L 1'  Standing Hams stretch on stair R/L 1'     Charges: AQ x3 (cq)     Assessment: Patient noted feeling better after aquatic session.    Plan: continue aquatic sessions to improve posture and core strength to improve ADL.  Patient scheduled land re-check for July 8th.     Constantino Potter, PTA

## 2024-06-17 ENCOUNTER — TREATMENT (OUTPATIENT)
Dept: PHYSICAL THERAPY | Facility: CLINIC | Age: 89
End: 2024-06-17
Payer: MEDICARE

## 2024-06-17 DIAGNOSIS — M54.16 LUMBAR RADICULOPATHY: ICD-10-CM

## 2024-06-17 PROCEDURE — 97113 AQUATIC THERAPY/EXERCISES: CPT | Mod: GP,CQ | Performed by: SPECIALIST/TECHNOLOGIST

## 2024-06-17 ASSESSMENT — PAIN - FUNCTIONAL ASSESSMENT: PAIN_FUNCTIONAL_ASSESSMENT: 0-10

## 2024-06-17 ASSESSMENT — PAIN SCALES - GENERAL: PAINLEVEL_OUTOF10: 0 - NO PAIN

## 2024-06-17 NOTE — PROGRESS NOTES
Physical Therapy  Physical Therapy Treatment    Patient Name: Annita Mcleod  MRN: [id number]  Today's Date: 6/17/2024  Time Calculation  Start Time: 1158  Stop Time: 1244  Time Calculation (min): 46 min    Insurance:  Visit number: 5 of [employer]  Authorization info: No Auth Needed  Insurance Type: [facility] Medicare  Cert date start: 4/9/24        Cert date end: 7/8/24                General  Reason for Referral: lumbar radic  Referred By: RACHEL Perez MD    Current Problem  1. Lumbar radiculopathy  Follow Up In Physical Therapy          Precautions  STEADI Fall Risk Score (The score of 4 or more indicates an increased risk of falling): 5  Precautions Comment: none    Pain Assessment: 0-10  Pain Score: 0 - No pain    Subjective:   Patient reports having only a couple of episodes of pain since last session.  Overall much better since starting aquatic therapy. Patient notes no good pool access here in [place].  She has good access when in [place] for the winter.    HEP Performed:  Yes    Objective:   Slightly forward posture     Treatments:   Pool Depth: 4 foot, Temperature 92°  Forward/retro walk 5'   Side Step 4'   HR/TR 2'     5 foot work white noodle  Bicycle 5'   Alt Hams 4'  Hip ext R/L 2.5' each   Hip abd/add R/L 2.5' each (feels effort into back)   Leg circles cw/ccw 2' each   Hang 4'     4' deep  Standing HF stretch R/L 1'  Standing Hams stretch on stair R/L 1'     Charges: AQ x3 (cq)     Assessment: Patient noted effort of circles and stretches at end of session.   Patient noted feeling better after aquatic session.      Plan: continue aquatic sessions to improve posture and core strength to improve ADL.  Patient scheduled land re-check for July 8th.     Constantino Potter, PTA

## 2024-06-19 ENCOUNTER — APPOINTMENT (OUTPATIENT)
Dept: PHYSICAL THERAPY | Facility: CLINIC | Age: 89
End: 2024-06-19
Payer: MEDICARE

## 2024-06-20 ENCOUNTER — PATIENT OUTREACH (OUTPATIENT)
Dept: PRIMARY CARE | Facility: CLINIC | Age: 89
End: 2024-06-20
Payer: MEDICARE

## 2024-06-24 ENCOUNTER — TREATMENT (OUTPATIENT)
Dept: PHYSICAL THERAPY | Facility: CLINIC | Age: 89
End: 2024-06-24
Payer: MEDICARE

## 2024-06-24 ENCOUNTER — HOSPITAL ENCOUNTER (OUTPATIENT)
Dept: RESPIRATORY THERAPY | Facility: CLINIC | Age: 89
Discharge: HOME | End: 2024-06-24
Payer: MEDICARE

## 2024-06-24 DIAGNOSIS — R06.09 DOE (DYSPNEA ON EXERTION): ICD-10-CM

## 2024-06-24 DIAGNOSIS — M54.16 LUMBAR RADICULOPATHY: ICD-10-CM

## 2024-06-24 PROCEDURE — 94729 DIFFUSING CAPACITY: CPT

## 2024-06-24 PROCEDURE — 94060 EVALUATION OF WHEEZING: CPT

## 2024-06-24 PROCEDURE — 97113 AQUATIC THERAPY/EXERCISES: CPT | Mod: GP,CQ | Performed by: SPECIALIST/TECHNOLOGIST

## 2024-06-24 ASSESSMENT — PAIN - FUNCTIONAL ASSESSMENT: PAIN_FUNCTIONAL_ASSESSMENT: 0-10

## 2024-06-24 ASSESSMENT — PAIN SCALES - GENERAL: PAINLEVEL_OUTOF10: 1

## 2024-06-24 NOTE — PROGRESS NOTES
"Physical Therapy  Physical Therapy Treatment    Patient Name: Annita Mcleod  MRN: [id number]  Today's Date: 6/24/2024  Time Calculation  Start Time: 1455  Stop Time: 1546  Time Calculation (min): 51 min    Insurance:  Visit number: 6 of [employer]  Authorization info: No Auth Needed  Insurance Type: [facility] Medicare  Cert date start: 4/9/24        Cert date end: 7/8/24                General  Reason for Referral: lumbar radic  Referred By: RACHEL Perez MD    Current Problem  1. Lumbar radiculopathy  Follow Up In Physical Therapy          Precautions  STEADI Fall Risk Score (The score of 4 or more indicates an increased risk of falling): 5  Precautions Comment: none    Pain Assessment: 0-10  0-10 (Numeric) Pain Score: 1 (just a little)    Subjective:   Patient reports feeling pretty good with \"just a little\" pain on arrival today.  Patient's daughter notes she will be trying to get mom into a silver sneakers program with pool access (not yet completed).   HEP Performed:  Yes    Objective:   Slightly forward posture     Treatments:   Pool Depth: 4 foot, Temperature 92°  Forward/retro walk 5'   Side Step 4'   HR/TR 2'     5 foot work white noodle  Bicycle 5'   Alt Hams 4'  Hip ext R/L 2.5' each   Hip abd/add R/L 2.5' each (feels effort into back)   Leg circles cw/ccw 2' each   Hang 4'     4' deep  V-sit 1'   Standing HF stretch R/L 1'  Standing Hams stretch on stair R/L 1'     Charges: AQ x3 (cq)     Assessment: Patient tolerated intensity with minimal fatigue noting feeling good post treatment.     Plan: continue aquatic sessions to improve posture and core strength to improve ADL.  Patient scheduled land re-check for July 8th.     Constantino Potter PTA  "

## 2024-06-25 ENCOUNTER — LAB (OUTPATIENT)
Dept: LAB | Facility: LAB | Age: 89
End: 2024-06-25
Payer: MEDICARE

## 2024-06-25 ENCOUNTER — OFFICE VISIT (OUTPATIENT)
Dept: PULMONOLOGY | Facility: CLINIC | Age: 89
End: 2024-06-25
Payer: MEDICARE

## 2024-06-25 ENCOUNTER — DOCUMENTATION (OUTPATIENT)
Dept: PULMONOLOGY | Facility: HOSPITAL | Age: 89
End: 2024-06-25

## 2024-06-25 VITALS
SYSTOLIC BLOOD PRESSURE: 116 MMHG | DIASTOLIC BLOOD PRESSURE: 65 MMHG | RESPIRATION RATE: 16 BRPM | TEMPERATURE: 97.2 F | OXYGEN SATURATION: 98 % | WEIGHT: 120.6 LBS | HEART RATE: 68 BPM | BODY MASS INDEX: 22.06 KG/M2

## 2024-06-25 DIAGNOSIS — R06.09 DOE (DYSPNEA ON EXERTION): ICD-10-CM

## 2024-06-25 DIAGNOSIS — R06.09 DOE (DYSPNEA ON EXERTION): Primary | ICD-10-CM

## 2024-06-25 LAB
ANION GAP SERPL CALC-SCNC: 15 MMOL/L (ref 10–20)
BUN SERPL-MCNC: 23 MG/DL (ref 6–23)
CALCIUM SERPL-MCNC: 9.5 MG/DL (ref 8.6–10.3)
CHLORIDE SERPL-SCNC: 103 MMOL/L (ref 98–107)
CO2 SERPL-SCNC: 27 MMOL/L (ref 21–32)
CREAT SERPL-MCNC: 1.01 MG/DL (ref 0.5–1.05)
D DIMER PPP FEU-MCNC: <215 NG/ML FEU
EGFRCR SERPLBLD CKD-EPI 2021: 52 ML/MIN/1.73M*2
GLUCOSE SERPL-MCNC: 96 MG/DL (ref 74–99)
POTASSIUM SERPL-SCNC: 4.5 MMOL/L (ref 3.5–5.3)
SODIUM SERPL-SCNC: 140 MMOL/L (ref 136–145)

## 2024-06-25 PROCEDURE — 85379 FIBRIN DEGRADATION QUANT: CPT

## 2024-06-25 PROCEDURE — 3078F DIAST BP <80 MM HG: CPT | Performed by: INTERNAL MEDICINE

## 2024-06-25 PROCEDURE — 1036F TOBACCO NON-USER: CPT | Performed by: INTERNAL MEDICINE

## 2024-06-25 PROCEDURE — 1159F MED LIST DOCD IN RCRD: CPT | Performed by: INTERNAL MEDICINE

## 2024-06-25 PROCEDURE — 1160F RVW MEDS BY RX/DR IN RCRD: CPT | Performed by: INTERNAL MEDICINE

## 2024-06-25 PROCEDURE — 80048 BASIC METABOLIC PNL TOTAL CA: CPT

## 2024-06-25 PROCEDURE — 99214 OFFICE O/P EST MOD 30 MIN: CPT | Performed by: INTERNAL MEDICINE

## 2024-06-25 PROCEDURE — 3074F SYST BP LT 130 MM HG: CPT | Performed by: INTERNAL MEDICINE

## 2024-06-25 PROCEDURE — 36415 COLL VENOUS BLD VENIPUNCTURE: CPT

## 2024-06-25 ASSESSMENT — ENCOUNTER SYMPTOMS
CONSTIPATION: 0
STRIDOR: 0
FATIGUE: 0
SPEECH DIFFICULTY: 0
BRUISES/BLEEDS EASILY: 0
ADENOPATHY: 0
NAUSEA: 0
SINUS PAIN: 0
TREMORS: 0
FACIAL SWELLING: 0
HEMATURIA: 0
DYSURIA: 0
ABDOMINAL DISTENTION: 0
SLEEP DISTURBANCE: 0
UNEXPECTED WEIGHT CHANGE: 0
LIGHT-HEADEDNESS: 0
DIFFICULTY URINATING: 0
PALPITATIONS: 0
ARTHRALGIAS: 0
WEAKNESS: 0
EYE DISCHARGE: 0
EYE REDNESS: 0
WHEEZING: 0
AGITATION: 0
NUMBNESS: 0
RHINORRHEA: 0
ABDOMINAL PAIN: 0
SINUS PRESSURE: 0
CHOKING: 0
NERVOUS/ANXIOUS: 0
DIZZINESS: 0
FEVER: 0
JOINT SWELLING: 0
APNEA: 0
SHORTNESS OF BREATH: 1
FREQUENCY: 0
HEADACHES: 0
COUGH: 1

## 2024-06-25 NOTE — PATIENT INSTRUCTIONS
Will order labs for D Dimer in screen for pulmonary emboli; after that will decide what further diagnostic testing is needed

## 2024-06-25 NOTE — PROGRESS NOTES
@PULMONARY FOLLOW-UP@       PROBLEM: GODOY    ASSESSMENT:  The patient is a 89-year-old although she may be some years younger based on her actual date of birth, who has hypertension hyperlipidemia osteoporosis and coronary disease having had bypass surgery in the past and recent stenting.  Her dyspnea has definitely worsened over the last year and her lungs are clear on auscultation.  Her PFTs reveal no airflow obstruction and her recent echocardiogram reveals normal LV function with normal right ventricle with estimated right ventricular pressure of 32 mmHg.  There is 1+ TR.  Her shortness of breath is unexplained at the present time and I suppose thromboembolic disease is always possible.  She clearly does not have any evidence of left ventricular failure or COPD or asthma.  She does not have any anemia.  Several years ago she had stable nodularity noted on her CT scan.  PLAN:  I am going to order an occlusion D-dimer and further diagnostics pending the results.      HISTORY OF PRESENT ILLNESS:   the patient is a 89-year-old who I last saw on 2021.  At that time it was reported she had a  history of hypertension, hyperlipidemia, osteoporosis coronary disease, kidney stones and diverticulitis. She has had bypass surgery in the past and a remote history of asthmatic bronchitis with chronic back issues. Memory loss has been an issue and she has also had urinary tract infections. On May 22, 2020 she underwent a CT scan because of nodularity of the chest and revealed the followin. The findings on today's exam are stable from the prior. Several pulmonary nodules to include ground-glass opacities are identical in size or appearance to the prior exam from 2019. The patient is reported to be a never smoker.  The patient apparently in February of this year had a myocardial infarction when she was on [place]. In relationship to this 3 stents were inserted. She has had previous bypass surgery. There is a question  that she may need some intervention in the right coronary artery. Potentially they had her on lisinopril at that point in time and her coughing was worse. She subsequently has been off this medication and currently is on losartan. She has no seasonal allergies or history of eczema. She has noted increasing coughing over the last 6 months. She has been troubled with asthmatic bronchitis type coughing for many years. I have seen her in the remote past for this. She has no fevers or chills. There has been some issue with memory and she has been followed for scattered pulmonary nodularity as outlined. She is a never smoker.  She was begun on Flovent and a chest CT and PFTs were to be ordered.    The CT scan from August 11, 2021 revealed the following  1. No evidence of pulmonary fibrosis. Interval development of mild  bibasilar atelectasis, with resolution in prone imaging.  2. No new pulmonary nodules. Stable diffuse solid and ground-glass  pulmonary nodules, with upper lobe predominance, greater in the right  than left, measuring up to 1.1 cm.  3. Stable mild bilateral calcified plaques.  4. Severe coronary calcifications and severe aortic atherosclerotic  calcifications.    A CT angiogram from December 1, 2022 revealed the following  No CT evidence of pulmonary embolism.   No acute parenchymal lung abnormality.     Multiple small solid, subsolid and groundglass nodular opacities   identified as described in the body of the report. Largest nodule   measures 6 mm.     A chest x-ray from June 4, 2024 revealed the following  Emphysematous changes in the lungs. No evidence of acute  cardiopulmonary process.    It was noted that she recent was admitted to the hospital through May 24 and May 25, 2024 with hypertension after catheterization.  She also had intractable headaches.  It is noted that on  October 26 2023   she returned from [place] with dyspnea and was found to have a critical right coronary obstruction and  underwent rotational arthrectomy with shockwave therapy and stenting.  I catheterization on in May 2024 outlined medically treated coronary disease.  Her ejection fraction was 70%.  She was seen by Dr. Khalil on Stephany 10, 2024 and felt to have stable CAD.  Her ejection fraction was 70%.    The echocardiogram from Stephany 10, 2024 revealed the following  - The left ventricle is normal in size. Left ventricular systolic function is   normal. EF = 70 ± 5% (2D biplane) Grade I left ventricular diastolic dysfunction.   - The right ventricle is normal in size. Right ventricular systolic function is   normal.   - The left atrial cavity is mildly dilated.   - There is mild (1+) tricuspid regurgitation.   - Estimated right ventricular systolic pressure is 32 mmHg consistent with normal   pulmonary artery pressures. Estimated right atrial pressure is 3 mmHg based on IVC     Pulmonary function test from June 24, 2024 outlined that the FVC was 2.54 L 130% predicted with an FEV1 of 1.84 L 155% predicted and FEV1 percent of 72%.  The diffusing capacity was 98% predicted.  There was a borderline bronchodilator response.  The FEV1 improved 9% to 2.01 L or 169% predicted.    It is reported the patient has been short of breath over the last year and a half.  When she had cardiac stenting about a year and a half she improved but thereafter within 6 months she started noting shortness of breath with exertion and it continues to worsen.  She has some intermittent coughing and at times has to bring up mucus but she is not wheezing.  She has no chest pains or pressures.  She has no swelling of her ankles.  She will start panting if she has to walk any distance.  Her daughter states that this is new    No Known Allergies         Current Outpatient Medications:     albuterol 90 mcg/actuation inhaler, Inhale 2 puffs every 6 hours if needed for wheezing., Disp: 18 g, Rfl: 11    amLODIPine (Norvasc) 2.5 mg tablet, Take 1 tablet (2.5 mg) by mouth  2 times a day., Disp: , Rfl:     atorvastatin (Lipitor) 40 mg tablet, Take 1 tablet (40 mg) by mouth once daily at bedtime., Disp: , Rfl:     cholecalciferol (Vitamin D-3) 25 MCG (1000 UT) capsule, Take 1 capsule (25 mcg) by mouth once daily., Disp: , Rfl:     clopidogrel (Plavix) 75 mg tablet, Take 1 tablet (75 mg) by mouth once daily., Disp: 90 tablet, Rfl: 3    fluticasone (Flovent) 110 mcg/actuation inhaler, Inhale 2 puffs 2 times a day., Disp: , Rfl:     levothyroxine (Synthroid, Levoxyl) 25 mcg tablet, Take 1 tablet (25 mcg) by mouth once daily., Disp: 90 tablet, Rfl: 3    losartan (Cozaar) 50 mg tablet, Take 1 tablet (50 mg) by mouth once daily., Disp: 90 tablet, Rfl: 2    metoprolol tartrate (Lopressor) 25 mg tablet, Take 0.25 tablets (6.25 mg) by mouth 2 times a day., Disp: , Rfl:     nitroglycerin (Nitrostat) 0.4 mg SL tablet, Place 1 tablet (0.4 mg) under the tongue every 5 minutes if needed for chest pain., Disp: , Rfl:     ranolazine (Ranexa) 500 mg 12 hr tablet, Take 1 tablet (500 mg) by mouth 2 times a day. Do not crush, chew, or split., Disp: , Rfl:     sertraline (Zoloft) 25 mg tablet, Take 0.5 tablets (12.5 mg) by mouth once daily., Disp: , Rfl:     gabapentin (Neurontin) 300 mg capsule, Take 1 capsule (300 mg) by mouth 3 times a day., Disp: 270 capsule, Rfl: 1          Review of Systems   Constitutional:  Negative for fatigue, fever and unexpected weight change.   HENT:  Negative for congestion, facial swelling, nosebleeds, postnasal drip, rhinorrhea, sinus pressure and sinus pain.    Eyes:  Negative for discharge, redness and visual disturbance.   Respiratory:  Positive for cough and shortness of breath. Negative for apnea, choking, wheezing and stridor.    Cardiovascular:  Negative for chest pain, palpitations and leg swelling.   Gastrointestinal:  Negative for abdominal distention, abdominal pain, constipation and nausea.   Endocrine: Negative for cold intolerance and heat intolerance.    Genitourinary:  Negative for difficulty urinating, dysuria, frequency and hematuria.   Musculoskeletal:  Negative for arthralgias, gait problem and joint swelling.   Allergic/Immunologic: Negative for environmental allergies, food allergies and immunocompromised state.   Neurological:  Negative for dizziness, tremors, syncope, speech difficulty, weakness, light-headedness, numbness and headaches.   Hematological:  Negative for adenopathy. Does not bruise/bleed easily.   Psychiatric/Behavioral:  Negative for agitation, behavioral problems and sleep disturbance. The patient is not nervous/anxious.         Vitals:    06/25/24 1054   BP: 116/65   Pulse: 68   Resp: 16   Temp: 36.2 °C (97.2 °F)   SpO2: 98%        Physical Exam  Vitals reviewed.   Constitutional:       Appearance: Normal appearance.   HENT:      Head: Normocephalic and atraumatic.   Eyes:      Extraocular Movements: Extraocular movements intact.   Cardiovascular:      Rate and Rhythm: Normal rate and regular rhythm.      Heart sounds: No murmur heard.     No friction rub. No gallop.   Pulmonary:      Effort: Pulmonary effort is normal. No respiratory distress.      Breath sounds: Normal breath sounds. No stridor. No wheezing, rhonchi or rales.   Chest:      Chest wall: No tenderness.   Abdominal:      General: Abdomen is flat. There is no distension.      Palpations: Abdomen is soft. There is no mass.      Tenderness: There is no abdominal tenderness.   Musculoskeletal:         General: Normal range of motion.      Cervical back: Normal range of motion.      Right lower leg: No edema.      Left lower leg: No edema.   Skin:     General: Skin is warm and dry.   Neurological:      Mental Status: She is alert and oriented to person, place, and time.   Psychiatric:         Mood and Affect: Mood normal.         Behavior: Behavior normal.

## 2024-06-25 NOTE — PROGRESS NOTES
D-dimer levels totally normal.  Will obtain an HRCT scan of the chest because of her profound dyspnea.

## 2024-06-28 ENCOUNTER — TREATMENT (OUTPATIENT)
Dept: PHYSICAL THERAPY | Facility: CLINIC | Age: 89
End: 2024-06-28
Payer: MEDICARE

## 2024-06-28 DIAGNOSIS — M54.16 LUMBAR RADICULOPATHY: ICD-10-CM

## 2024-06-28 PROCEDURE — 97113 AQUATIC THERAPY/EXERCISES: CPT | Mod: GP,CQ | Performed by: SPECIALIST/TECHNOLOGIST

## 2024-06-28 ASSESSMENT — PAIN - FUNCTIONAL ASSESSMENT: PAIN_FUNCTIONAL_ASSESSMENT: 0-10

## 2024-06-28 ASSESSMENT — PAIN SCALES - GENERAL: PAINLEVEL_OUTOF10: 0 - NO PAIN

## 2024-06-28 NOTE — PROGRESS NOTES
Physical Therapy  Physical Therapy Treatment    Patient Name: Annita Mcleod  MRN: [id number]  Today's Date: 6/28/2024  Time Calculation  Start Time: 0830  Stop Time: 0916  Time Calculation (min): 46 min    Insurance:  Visit number: 7 of [employer]  Authorization info: No Auth Needed  Insurance Type: [facility] Medicare  Cert date start: 4/9/24        Cert date end: 7/8/24                General  Reason for Referral: lumbar radic  Referred By: RACHEL Perez MD    Current Problem  1. Lumbar radiculopathy  Follow Up In Physical Therapy          Precautions  STEADI Fall Risk Score (The score of 4 or more indicates an increased risk of falling): 5  Precautions Comment: none    Pain Assessment: 0-10  0-10 (Numeric) Pain Score: 0 - No pain    Subjective:   Patient reports feeling better without pain on arrival.    HEP Performed:  Yes    Objective:   Slightly forward posture     Treatments:   Pool Depth: 4 foot, Temperature 92°  Forward/retro walk 5'   Side Step 4'   HR/TR 2'     5 foot work white noodle  Bicycle 5'   Alt Hams 4'  Hip ext R/L 2.5' each   Hip abd/add R/L 2.5' each (feels effort into back)   Leg circles cw/ccw 2' each   Hang 4'     4' deep  V-sit 1'   Standing HF stretch R/L 1'  Standing Hams stretch on stair R/L 1'     Charges: AQ x3 (cq)     Assessment: Patient had no problems with aquatic exercise today.  Patient noted feeling good effort with circles and AP kicks.      Plan: continue aquatic sessions to improve posture and core strength to improve ADL.  Patient has scheduled land re-check for July 8th.     Constantino Potter, PTA

## 2024-07-02 ENCOUNTER — CLINICAL SUPPORT (OUTPATIENT)
Dept: PHYSICAL THERAPY | Facility: CLINIC | Age: 89
End: 2024-07-02
Payer: MEDICARE

## 2024-07-02 ENCOUNTER — APPOINTMENT (OUTPATIENT)
Dept: PHYSICAL THERAPY | Facility: CLINIC | Age: 89
End: 2024-07-02
Payer: MEDICARE

## 2024-07-02 DIAGNOSIS — M54.16 LUMBAR RADICULOPATHY: Primary | ICD-10-CM

## 2024-07-02 PROCEDURE — 97113 AQUATIC THERAPY/EXERCISES: CPT | Mod: GP,CQ | Performed by: SPECIALIST/TECHNOLOGIST

## 2024-07-02 ASSESSMENT — PAIN SCALES - GENERAL: PAINLEVEL_OUTOF10: 0 - NO PAIN

## 2024-07-02 ASSESSMENT — PAIN - FUNCTIONAL ASSESSMENT: PAIN_FUNCTIONAL_ASSESSMENT: 0-10

## 2024-07-02 NOTE — PROGRESS NOTES
Physical Therapy  Physical Therapy Treatment    Patient Name: Annita Mcleod  MRN: [id number]  Today's Date: 7/2/2024  Time Calculation  Start Time: 1347  Stop Time: 1432  Time Calculation (min): 45 min    Insurance:  Visit number: 8 of [employer]  Authorization info: No Auth Needed  Insurance Type: [facility] Medicare  Cert date start: 4/9/24        Cert date end: 7/8/24                General  Reason for Referral: lumbar radic  Referred By: RACHEL Perez MD    Current Problem  1. Lumbar radiculopathy            Precautions  STEADI Fall Risk Score (The score of 4 or more indicates an increased risk of falling): 5  Precautions Comment: none    Pain Assessment: 0-10  0-10 (Numeric) Pain Score: 0 - No pain    Subjective:   Patient reports feeling much better and aquatic exercise has really helped.  Patient reports no pain on arrival.      HEP Performed:  Yes    Objective:   Slightly forward posture     Treatments:   Pool Depth: 4 foot, Temperature 92°  Forward/retro walk 5'   Side Step 4'   HR/TR 2'     5 foot work white noodle  Bicycle 5'   Alt Hams 4'  Hip ext R/L 2.5' each   Hip abd/add R/L 2.5' each (feels effort into back)   Hang 4'     4' deep  V-sit 1'   Standing HF stretch R/L 1'  Standing Hams stretch on stair R/L 1'     Charges: AQ x3 (cq)     Assessment: Patient seemed to be breathing heavier today (omitted leg circles).  Patient noted feeling good in water.       Plan: continue aquatic sessions to improve posture and core strength to improve ADL.  Patient has scheduled land re-check for July 8th.  Patient has pulmonary follow-up in Mid July.      Constantino Potter, PTA

## 2024-07-03 NOTE — PROGRESS NOTES
Physical Therapy  Physical Therapy Orthopedic Progress Note    Patient Name: Annita Mcleod  MRN: [id number]  Today's Date: 7/8/2024  Time Calculation  Start Time: 0945  Stop Time: 1010  Time Calculation (min): 25 min    Insurance:  Visit number: 9 of [employer]  Authorization info: No Auth Needed  Insurance Type: [facility] Medicare  Cert date start: 4/9/24        Cert date end: 7/8/24                 General  Reason for Referral: lumbar radic  Referred By: RACHEL Perez MD    Current Problem  1. Lumbar radiculopathy  Follow Up In Physical Therapy          Precautions  Precautions Comment: none      Subjective:    Patient reports the pain is much less frequent and is doing much better. Is looking into what pool she can use as she transitions to land PT.    Current Condition:   Better    Pain:  Pain Assessment: 0-10  0-10 (Numeric) Pain Score: 0 - No pain    Self Reported Function (0-100%) = 75%  (100% being back to PLOF)    Objective:    Gait: normal gait pattern                     Balance: SLS-     R: 2 sec.           L: 4 sec.     Palpation: TTP across low back      Flexibility: mod tight B HS, quad, HF, gastroc     ROM     Lumbar AROM (%)     Flexion: 50% P  Extension: 10% P  (L) Side Bend: 25% P  (R) Side Bend: 25% P  (L) Rotation: 75% P  (R) Rotation: 50% P        Hip AROM (Degrees): R=L                                   Strength Testing (pain with all testing)     LE strength MMT          R         L  Hip flexion                   4/5       4/5  Hip extension              3-/5     3-/5  Hip abduction              4-/5     4-/5  Hip adduction              3+/5      3+/5  Hip IR                           4-/5     4-/5  Hip ER                          4-/5     4-/5  Knee extension            4/5      4-/5  Knee flexion                 4/5       4-/5            Outcome Measures: Updated 7/8/2024  Other Measures  Oswestry Disablity Index (BLANCHE): 17/50     EDUCATION: home exercise program, plan of care, activity modifications,  pain management, and injury pathology       Goals: Updated 7/8/2024  Active       PT Problem       STG (Met)       Start:  04/09/24    Expected End:  07/08/24    Resolved:  07/08/24    Patient will decrease pain to 0-2/10 in 4 weeks.   Patient will increase strength by >/= 1/2 mm grade in 4 weeks.  Oswestry: -6 in 4 weeks.               LTG (Progressing)       Start:  04/09/24    Expected End:  07/08/24       Patient will be able to walk >10 mins without increased pain in 8 weeks.  Patient will be able to ascend/descend stairs with step through pattern in 8 weeks.    Patient will increase LE flexibility to min tight in 8 weeks.   Patient will be able to maintain SLS >/= 10 seconds in 8 weeks.              Plan of care was developed with input and agreement by the patient      Treatment Performed:  Recheck completed  Reviewed HEP and discussed plan moving forward    Charges: TEx2    Assessment: Patient with significant improvement in LBP and radicular symptoms since beginning PT. Advised to find community pool to use as she transitions to land PT. Would benefit from continued skilled PT to address remaining deficits for return to PLOF.      Plan: Updated 7/8/2024    Planned Interventions include: therapeutic exercise, self-care home management, manual therapy, therapeutic activities, gait training, neuromuscular coordination, vasopneumatic, dry needling, aquatic therapy  Frequency: 1 x Week  Duration: 6 Weeks    Carrie Choudhury, PT

## 2024-07-08 ENCOUNTER — TREATMENT (OUTPATIENT)
Dept: PHYSICAL THERAPY | Facility: CLINIC | Age: 89
End: 2024-07-08
Payer: MEDICARE

## 2024-07-08 DIAGNOSIS — M54.16 LUMBAR RADICULOPATHY: ICD-10-CM

## 2024-07-08 PROCEDURE — 97110 THERAPEUTIC EXERCISES: CPT | Mod: GP

## 2024-07-08 ASSESSMENT — PAIN SCALES - GENERAL: PAINLEVEL_OUTOF10: 0 - NO PAIN

## 2024-07-08 ASSESSMENT — PAIN - FUNCTIONAL ASSESSMENT: PAIN_FUNCTIONAL_ASSESSMENT: 0-10

## 2024-07-09 ENCOUNTER — APPOINTMENT (OUTPATIENT)
Dept: PULMONOLOGY | Facility: CLINIC | Age: 89
End: 2024-07-09
Payer: MEDICARE

## 2024-07-10 ENCOUNTER — TELEMEDICINE (OUTPATIENT)
Dept: PRIMARY CARE | Facility: CLINIC | Age: 89
End: 2024-07-10
Payer: MEDICARE

## 2024-07-10 DIAGNOSIS — U07.1 COVID-19 VIRUS INFECTION: Primary | ICD-10-CM

## 2024-07-10 PROCEDURE — 1036F TOBACCO NON-USER: CPT | Performed by: STUDENT IN AN ORGANIZED HEALTH CARE EDUCATION/TRAINING PROGRAM

## 2024-07-10 PROCEDURE — 99441 PR PHYS/QHP TELEPHONE EVALUATION 5-10 MIN: CPT | Performed by: STUDENT IN AN ORGANIZED HEALTH CARE EDUCATION/TRAINING PROGRAM

## 2024-07-10 PROCEDURE — 1160F RVW MEDS BY RX/DR IN RCRD: CPT | Performed by: STUDENT IN AN ORGANIZED HEALTH CARE EDUCATION/TRAINING PROGRAM

## 2024-07-10 PROCEDURE — 1159F MED LIST DOCD IN RCRD: CPT | Performed by: STUDENT IN AN ORGANIZED HEALTH CARE EDUCATION/TRAINING PROGRAM

## 2024-07-10 RX ORDER — PREDNISONE 20 MG/1
TABLET ORAL
Qty: 10 TABLET | Refills: 0 | Status: SHIPPED | OUTPATIENT
Start: 2024-07-10

## 2024-07-10 NOTE — PROGRESS NOTES
Subjective   Patient ID: Annita Mcleod is a 89 y.o. female who presents for Follow-up (Tested positive for COVID. ).        HPI  Telephone call due to technical issues on pt's end   Daughter giacomo also participated in the call   Pt reported flu like sx  and fatigue which prompted the testing positive home covid test yesterday     Pox 97 % on RA            Visit Vitals  Smoking Status Never      No LMP recorded.   Current Outpatient Medications   Medication Instructions    albuterol 90 mcg/actuation inhaler 2 puffs, inhalation, Every 6 hours PRN    amLODIPine (NORVASC) 2.5 mg, oral, 2 times daily    atorvastatin (Lipitor) 40 mg tablet 1 tablet, oral, Nightly    cholecalciferol (VITAMIN D-3) 25 mcg, oral, Daily    clopidogrel (PLAVIX) 75 mg, oral, Daily    fluticasone (Flovent) 110 mcg/actuation inhaler 2 puffs, inhalation, 2 times daily RT    gabapentin (NEURONTIN) 300 mg, oral, 3 times daily    levothyroxine (SYNTHROID, LEVOXYL) 25 mcg, oral, Daily    losartan (COZAAR) 50 mg, oral, Daily    metoprolol tartrate (LOPRESSOR) 6.25 mg, oral, 2 times daily    nitroglycerin (NITROSTAT) 0.4 mg, sublingual, Every 5 min PRN    ranolazine (RANEXA) 500 mg, oral, 2 times daily, Do not crush, chew, or split.    sertraline (Zoloft) 25 mg tablet 0.5 tablets, oral, Daily      Social History     Tobacco Use    Smoking status: Never    Smokeless tobacco: Never   Substance Use Topics    Alcohol use: Not Currently        Review of Systems    Constitutional :  feeling poorly  +. No fevers/ chills / night sweats. fatigue  +  Cardiovascular : No CP /Palpitations/ lower extremity edema / syncope   Respiratory : No Cough /GODOY/Dyspnea at rest   Gastrointestinal : No abd pain / N/V  No bloody stools/ melena / constipation  Endo : No polyuria/polydipsia/ muscle weakness / sluggishness   CNS: No confusion / HA/ tingling/ numbness/ weakness of extremities  Psychiatric: No anxiety/ depression/ SI/HI    All other systems have been reviewed and are  negative for complaint       Physical Exam    Briefly spoke on the telphone   Alert , appropriately answered the Qs   No Obvious resp distress during conversation     Assessment/Plan   Diagnoses and all orders for this visit:  COVID-19 virus infection  -     nirmatrelvir-ritonavir (PAXLOVID) 300 mg (150 mg x 2)-100 mg tablet therapy pack; Take 3 tablets by mouth 2 times a day for 5 days. Follow the instructions on the package  -     predniSONE (Deltasone) 20 mg tablet; Take 2 tablets once daily      Rx as above   Symptomatic Rx  with analgesics/ antipyretics and decongestants for sx relief.  Sx course, recovery and residual sx  ( lingering cough , fatigue etc.,) discussed.   Paxlovid discussed, MC SEs and to hold statin during duration of rx advised.   Renal function reviewed.  Discussed the sx , course and worsening sx that would need immediate attention requiring ER visit and hospitalization if needed    Discussed COVID-19 Pandemic best practices for avoidance,  wearing mask when leave the home, only leaving home for essential reasons, maintain social distancing, etc.             Conditions addressed and mgmt as noted above.  Pertinent labs, images/ imaging reports , chart review was done .   Age appropriate labs / labs for mgmt of chronic medical conditions ordered, further mgmt pending the results.

## 2024-07-10 NOTE — PROGRESS NOTES
Physical Therapy  Physical Therapy Treatment    Patient Name: Annita Mcleod  MRN: [id number]  Today's Date: 7/10/2024       Insurance:  Visit number: 10 of [employer]  Authorization info: No Auth Needed  Insurance Type: [employer] Medicare  Cert date start: 4/9/24        Cert date end: 7/8/24                 General  Reason for Referral: lumbar radic  Referred By: RACHEL Perez MD       Current Problem  No diagnosis found.              Subjective:   Patient reports ***  HEP Performed:  {Yes/No:92320}    Objective:     {PT Outcome Measures:90249}  Treatments:       Charges:     Assessment: ***      Plan: ***    Carrie Choudhury, PT

## 2024-07-11 ENCOUNTER — APPOINTMENT (OUTPATIENT)
Dept: RADIOLOGY | Facility: HOSPITAL | Age: 89
End: 2024-07-11
Payer: MEDICARE

## 2024-07-11 ENCOUNTER — APPOINTMENT (OUTPATIENT)
Dept: PHYSICAL THERAPY | Facility: CLINIC | Age: 89
End: 2024-07-11
Payer: MEDICARE

## 2024-07-12 ENCOUNTER — APPOINTMENT (OUTPATIENT)
Dept: RADIOLOGY | Facility: HOSPITAL | Age: 89
End: 2024-07-12
Payer: MEDICARE

## 2024-07-12 ENCOUNTER — APPOINTMENT (OUTPATIENT)
Dept: RADIOLOGY | Facility: CLINIC | Age: 89
End: 2024-07-12
Payer: MEDICARE

## 2024-07-17 ENCOUNTER — LAB (OUTPATIENT)
Dept: LAB | Facility: LAB | Age: 89
End: 2024-07-17
Payer: MEDICARE

## 2024-07-17 ENCOUNTER — OFFICE VISIT (OUTPATIENT)
Dept: PRIMARY CARE | Facility: CLINIC | Age: 89
End: 2024-07-17
Payer: MEDICARE

## 2024-07-17 ENCOUNTER — APPOINTMENT (OUTPATIENT)
Dept: RADIOLOGY | Facility: HOSPITAL | Age: 89
End: 2024-07-17
Payer: MEDICARE

## 2024-07-17 VITALS
WEIGHT: 118 LBS | HEIGHT: 62 IN | SYSTOLIC BLOOD PRESSURE: 126 MMHG | HEART RATE: 62 BPM | BODY MASS INDEX: 21.71 KG/M2 | DIASTOLIC BLOOD PRESSURE: 80 MMHG | OXYGEN SATURATION: 98 %

## 2024-07-17 DIAGNOSIS — R53.81 POST VIRAL DEBILITY: ICD-10-CM

## 2024-07-17 DIAGNOSIS — B94.8 POST VIRAL DEBILITY: ICD-10-CM

## 2024-07-17 DIAGNOSIS — R53.83 OTHER FATIGUE: Primary | ICD-10-CM

## 2024-07-17 DIAGNOSIS — R53.83 OTHER FATIGUE: ICD-10-CM

## 2024-07-17 LAB
ALBUMIN SERPL BCP-MCNC: 4 G/DL (ref 3.4–5)
ALP SERPL-CCNC: 88 U/L (ref 33–136)
ALT SERPL W P-5'-P-CCNC: 32 U/L (ref 7–45)
ANION GAP SERPL CALC-SCNC: 13 MMOL/L (ref 10–20)
AST SERPL W P-5'-P-CCNC: 18 U/L (ref 9–39)
BASOPHILS # BLD AUTO: 0.01 X10*3/UL (ref 0–0.1)
BASOPHILS NFR BLD AUTO: 0.1 %
BILIRUB SERPL-MCNC: 0.5 MG/DL (ref 0–1.2)
BUN SERPL-MCNC: 23 MG/DL (ref 6–23)
CALCIUM SERPL-MCNC: 9.2 MG/DL (ref 8.6–10.3)
CHLORIDE SERPL-SCNC: 101 MMOL/L (ref 98–107)
CO2 SERPL-SCNC: 28 MMOL/L (ref 21–32)
CREAT SERPL-MCNC: 0.96 MG/DL (ref 0.5–1.05)
EGFRCR SERPLBLD CKD-EPI 2021: 56 ML/MIN/1.73M*2
EOSINOPHIL # BLD AUTO: 0.11 X10*3/UL (ref 0–0.4)
EOSINOPHIL NFR BLD AUTO: 1.6 %
ERYTHROCYTE [DISTWIDTH] IN BLOOD BY AUTOMATED COUNT: 14.6 % (ref 11.5–14.5)
GLUCOSE SERPL-MCNC: 82 MG/DL (ref 74–99)
HCT VFR BLD AUTO: 48.2 % (ref 36–46)
HGB BLD-MCNC: 15.6 G/DL (ref 12–16)
IMM GRANULOCYTES # BLD AUTO: 0.08 X10*3/UL (ref 0–0.5)
IMM GRANULOCYTES NFR BLD AUTO: 1.2 % (ref 0–0.9)
LYMPHOCYTES # BLD AUTO: 2.41 X10*3/UL (ref 0.8–3)
LYMPHOCYTES NFR BLD AUTO: 35.5 %
MCH RBC QN AUTO: 27.9 PG (ref 26–34)
MCHC RBC AUTO-ENTMCNC: 32.4 G/DL (ref 32–36)
MCV RBC AUTO: 86 FL (ref 80–100)
MONOCYTES # BLD AUTO: 0.42 X10*3/UL (ref 0.05–0.8)
MONOCYTES NFR BLD AUTO: 6.2 %
NEUTROPHILS # BLD AUTO: 3.76 X10*3/UL (ref 1.6–5.5)
NEUTROPHILS NFR BLD AUTO: 55.4 %
NRBC BLD-RTO: 0 /100 WBCS (ref 0–0)
PLATELET # BLD AUTO: 353 X10*3/UL (ref 150–450)
POTASSIUM SERPL-SCNC: 4.9 MMOL/L (ref 3.5–5.3)
PROT SERPL-MCNC: 6.6 G/DL (ref 6.4–8.2)
RBC # BLD AUTO: 5.6 X10*6/UL (ref 4–5.2)
SODIUM SERPL-SCNC: 137 MMOL/L (ref 136–145)
T4 FREE SERPL-MCNC: 0.94 NG/DL (ref 0.61–1.12)
TSH SERPL-ACNC: 5.23 MIU/L (ref 0.44–3.98)
WBC # BLD AUTO: 6.8 X10*3/UL (ref 4.4–11.3)

## 2024-07-17 PROCEDURE — 1160F RVW MEDS BY RX/DR IN RCRD: CPT | Performed by: STUDENT IN AN ORGANIZED HEALTH CARE EDUCATION/TRAINING PROGRAM

## 2024-07-17 PROCEDURE — 80053 COMPREHEN METABOLIC PANEL: CPT

## 2024-07-17 PROCEDURE — 3074F SYST BP LT 130 MM HG: CPT | Performed by: STUDENT IN AN ORGANIZED HEALTH CARE EDUCATION/TRAINING PROGRAM

## 2024-07-17 PROCEDURE — 99214 OFFICE O/P EST MOD 30 MIN: CPT | Performed by: STUDENT IN AN ORGANIZED HEALTH CARE EDUCATION/TRAINING PROGRAM

## 2024-07-17 PROCEDURE — 1159F MED LIST DOCD IN RCRD: CPT | Performed by: STUDENT IN AN ORGANIZED HEALTH CARE EDUCATION/TRAINING PROGRAM

## 2024-07-17 PROCEDURE — 85025 COMPLETE CBC W/AUTO DIFF WBC: CPT

## 2024-07-17 PROCEDURE — 84439 ASSAY OF FREE THYROXINE: CPT

## 2024-07-17 PROCEDURE — 1036F TOBACCO NON-USER: CPT | Performed by: STUDENT IN AN ORGANIZED HEALTH CARE EDUCATION/TRAINING PROGRAM

## 2024-07-17 PROCEDURE — 36415 COLL VENOUS BLD VENIPUNCTURE: CPT

## 2024-07-17 PROCEDURE — 84443 ASSAY THYROID STIM HORMONE: CPT

## 2024-07-17 PROCEDURE — 3078F DIAST BP <80 MM HG: CPT | Performed by: STUDENT IN AN ORGANIZED HEALTH CARE EDUCATION/TRAINING PROGRAM

## 2024-07-17 RX ORDER — ASPIRIN 81 MG/1
81 TABLET ORAL DAILY
COMMUNITY

## 2024-07-17 NOTE — PROGRESS NOTES
"Subjective   Patient ID: Annita Mcleod is a 89 y.o. female who presents for Follow-up (Possible bronchitis. ).        HPI    Is here with her daughter   Tested positive for covid  on 7/9   Rx with paxolovid and prednisone   She feels very fatigued   Daughter worried abt bronchitis   Pt has ibs at baseline and takes cholestyramine   daughter  reports increased frequency of diarrhea while on paxlovid   Decreased PO intake             Visit Vitals  /80   Pulse 62   Ht 1.575 m (5' 2\")   Wt 53.5 kg (118 lb)   SpO2 98%   BMI 21.58 kg/m²   Smoking Status Never   BSA 1.53 m²      No LMP recorded.   Current Outpatient Medications   Medication Instructions    albuterol 90 mcg/actuation inhaler 2 puffs, inhalation, Every 6 hours PRN    amLODIPine (NORVASC) 2.5 mg, oral, 2 times daily    aspirin 81 mg, oral, Daily    atorvastatin (Lipitor) 40 mg tablet 1 tablet, oral, Nightly    cholecalciferol (VITAMIN D-3) 25 mcg, oral, Daily    clopidogrel (PLAVIX) 75 mg, oral, Daily    fluticasone (Flovent) 110 mcg/actuation inhaler 2 puffs, inhalation, 2 times daily RT    gabapentin (NEURONTIN) 300 mg, oral, 3 times daily    levothyroxine (SYNTHROID, LEVOXYL) 25 mcg, oral, Daily    losartan (COZAAR) 50 mg, oral, Daily    metoprolol tartrate (LOPRESSOR) 6.25 mg, oral, 2 times daily    nitroglycerin (NITROSTAT) 0.4 mg, sublingual, Every 5 min PRN    predniSONE (Deltasone) 20 mg tablet Take 2 tablets once daily    ranolazine (RANEXA) 500 mg, oral, 2 times daily, Do not crush, chew, or split.    sertraline (Zoloft) 25 mg tablet 0.5 tablets, oral, Daily      Social History     Tobacco Use    Smoking status: Never    Smokeless tobacco: Never   Substance Use Topics    Alcohol use: Not Currently        Review of Systems    Constitutional :  As noted in HPI   Cardiovascular : No CP /Palpitations/ lower extremity edema / syncope   Respiratory : No Cough /GODOY/Dyspnea at rest   Gastrointestinal : No abd pain / N/V  No bloody stools/ melena / " constipation  Endo : No polyuria/polydipsia/ muscle weakness / sluggishness   CNS: No confusion / HA/ tingling/ numbness/ weakness of extremities  Psychiatric: No anxiety/ depression/ SI/HI    All other systems have been reviewed and are negative for complaint       Physical Exam    Constitutional : Vitals reviewed. Alert and in no distress  Cardiovascular : RRR, Normal S1, S2, No pericardial rub/ gallop, no peripheral edema   Pulmonary: No respiratory distress, CTAB   Abd : TTP in the lower quadrants   Neurologic : CNs 2-12 grossly intact , no obvious FNDs  Psych : A,Ox3, normal mood and affect      Assessment/Plan   Diagnoses and all orders for this visit:  Other fatigue  -     CBC and Auto Differential; Future  -     Comprehensive Metabolic Panel; Future  -     TSH with reflex to Free T4 if abnormal; Future  Post viral debility  -     CBC and Auto Differential; Future  -     Comprehensive Metabolic Panel; Future  -     TSH with reflex to Free T4 if abnormal; Future          Fatigue  ;post covid vs dehydration from diarrhea   Labs as above   Suggested hospital visit for further eval , daughter and pt preferred out pt eval  HDS at the visit, further mgmt pending the results   Encouraged PO intake fluids and food         Conditions addressed and mgmt as noted above.  Pertinent labs, images/ imaging reports , chart review was done .   Age appropriate labs / labs for mgmt of chronic medical conditions ordered, further mgmt pending the results.

## 2024-07-24 DIAGNOSIS — E03.9 HYPOTHYROIDISM, UNSPECIFIED TYPE: ICD-10-CM

## 2024-07-24 RX ORDER — LEVOTHYROXINE SODIUM 50 UG/1
50 TABLET ORAL DAILY
Qty: 90 TABLET | Refills: 0 | Status: SHIPPED | OUTPATIENT
Start: 2024-07-24

## 2024-07-26 ENCOUNTER — HOSPITAL ENCOUNTER (OUTPATIENT)
Dept: RADIOLOGY | Facility: HOSPITAL | Age: 89
Discharge: HOME | End: 2024-07-26
Payer: MEDICARE

## 2024-07-26 ENCOUNTER — DOCUMENTATION (OUTPATIENT)
Dept: PULMONOLOGY | Facility: HOSPITAL | Age: 89
End: 2024-07-26
Payer: MEDICARE

## 2024-07-26 DIAGNOSIS — R06.09 DOE (DYSPNEA ON EXERTION): ICD-10-CM

## 2024-07-26 PROCEDURE — 71250 CT THORAX DX C-: CPT | Performed by: STUDENT IN AN ORGANIZED HEALTH CARE EDUCATION/TRAINING PROGRAM

## 2024-07-26 PROCEDURE — 71250 CT THORAX DX C-: CPT

## 2024-07-26 NOTE — PROGRESS NOTES
CT scan reveals no fibrosis with stable nodularity.  Her shortness of breath is not related to any fibrotic picture and she has no evidence of PE with a normal D-dimer.  Her echocardiogram revealed only minimally elevated RVSP.  She does some asthma and should continue with her asthma medications.  She will come back and see me in 3 months.

## 2024-08-01 ENCOUNTER — TREATMENT (OUTPATIENT)
Dept: PHYSICAL THERAPY | Facility: CLINIC | Age: 89
End: 2024-08-01
Payer: MEDICARE

## 2024-08-01 DIAGNOSIS — M54.16 LUMBAR RADICULOPATHY: ICD-10-CM

## 2024-08-01 PROCEDURE — 97110 THERAPEUTIC EXERCISES: CPT | Mod: GP,CQ

## 2024-08-01 ASSESSMENT — PAIN - FUNCTIONAL ASSESSMENT: PAIN_FUNCTIONAL_ASSESSMENT: 0-10

## 2024-08-01 ASSESSMENT — PAIN SCALES - GENERAL: PAINLEVEL_OUTOF10: 5 - MODERATE PAIN

## 2024-08-01 NOTE — PROGRESS NOTES
"Physical Therapy  Physical Therapy Treatment    Patient Name: Annita Mcleod  MRN: [id number]  Today's Date: 8/1/2024  Time Calculation  Start Time: 1040  Stop Time: 1130  Time Calculation (min): 50 min    Insurance:  Visit [place]: 10 of [place]  Authorization info: NAN  Insurance Type: [facility] Medicare  Cert date start: 7/9/24  Cert date end: 10/7/24               General   Reason for Referral: lumbar radic   Referred By: RACHEL Perez MD    Current Problem  1. Lumbar radiculopathy  Follow Up In Physical Therapy          Precautions  Precautions Comment: none    Pain Assessment: 0-10  0-10 (Numeric) Pain Score: 5 - Moderate pain    Subjective:   Patient reports that the water therapy really helped but that her insurance doesn't cover aquatic PT.  Pain today is a 5.  HEP Performed:  Yes    Objective:   Function: ambulating fwb.    Treatments:   T5 NuStep- 5'  DBE 2x1'  Hamstring curl 10# 1x10  Supine trunk rotations R-L- 3'  Iso abduction (YTBand)- 4'  Iso hip ext 5\"hold/rest- 4'  Egoscue 90 R/L- 6' per side  Reviewed HEP    Charges: TE 3    Assessment:   Patient is post Covid of 3 weeks and still recovering.  Did have shortness of breath coming off the T5 NuStep and balance board.  Tolerated the other exercises well but did have some lumbar discomfort with the iso hip adduction which was eliminated.  Requires short periods of rest and some water between the exercises.    Plan:   Continue decreasing back discomfort increasing rom, flexibility, mobility, balance, strength, endurance and function as symptoms allow.  Cristiano Linares, PTA  "

## 2024-08-06 ENCOUNTER — TREATMENT (OUTPATIENT)
Dept: PHYSICAL THERAPY | Facility: CLINIC | Age: 89
End: 2024-08-06
Payer: MEDICARE

## 2024-08-06 DIAGNOSIS — M54.16 LUMBAR RADICULOPATHY: ICD-10-CM

## 2024-08-06 PROCEDURE — 97112 NEUROMUSCULAR REEDUCATION: CPT | Mod: GP,CQ | Performed by: SPECIALIST/TECHNOLOGIST

## 2024-08-06 PROCEDURE — 97110 THERAPEUTIC EXERCISES: CPT | Mod: GP,CQ | Performed by: SPECIALIST/TECHNOLOGIST

## 2024-08-06 ASSESSMENT — PAIN - FUNCTIONAL ASSESSMENT: PAIN_FUNCTIONAL_ASSESSMENT: 0-10

## 2024-08-06 NOTE — PROGRESS NOTES
"Physical Therapy  Physical Therapy Treatment    Patient Name: Annita Mcleod  MRN: [id number]  Today's Date: 8/6/2024  Time Calculation  Start Time: 1115  Stop Time: 1200  Time Calculation (min): 45 min    Insurance:  Visit number: 10 of [place]  Authorization info: NAN  Insurance Type: [facility] Medicare  Cert date start: 7/9/24  Cert date end: 10/7/24               General  Reason for Referral: lumbar radic  Referred By: DAVID Hanleyeason for Referral: lumbar radic   Referred By: RACHEL Perez MD    Current Problem  1. Lumbar radiculopathy  Follow Up In Physical Therapy          Precautions  STEADI Fall Risk Score (The score of 4 or more indicates an increased risk of falling): 5  Precautions Comment: none    Pain Assessment: 0-10    Subjective:   Patient arrived full weight bearing without a limp.  Patient notes feeling much better since doing aquatic session.     HEP Performed:  Yes    Objective:   Function: ambulating fwb.  + L anterior innominate  TTP Slight proximal rectus   Hip ext 1\"   Quads 9\"     Treatments:   T5 NuStep- 5'  DBE 2x1'  4 kg KB iso SB R 30 sec, 2 kg KB Iso SB L 30s (tremor on L)  25# band R/L iso rot 30 s  Hamstring curl 10# 1x10  Supine trunk rotations R-L- 3'  Iso abduction (YTBand)- 4'  Iso hip ext 5\"hold/rest- 4'  Pressure BFBK brace 10x , brace & MIP 10x, Brace & SLR 5x R/L   Charges: TE 3    Assessment:   Patient had short term shortness of breath with exercise.  Patient tolerated intensity without increased symptoms.     Plan:   Continue decreasing back discomfort increasing rom, flexibility, mobility, balance, strength, endurance and function as symptoms allow.    Constantino Potter, PTA  "

## 2024-08-09 ENCOUNTER — TREATMENT (OUTPATIENT)
Dept: PHYSICAL THERAPY | Facility: CLINIC | Age: 89
End: 2024-08-09
Payer: MEDICARE

## 2024-08-09 DIAGNOSIS — M54.16 LUMBAR RADICULOPATHY: ICD-10-CM

## 2024-08-09 PROCEDURE — 97110 THERAPEUTIC EXERCISES: CPT | Mod: GP,CQ | Performed by: SPECIALIST/TECHNOLOGIST

## 2024-08-09 PROCEDURE — 97112 NEUROMUSCULAR REEDUCATION: CPT | Mod: GP,CQ | Performed by: SPECIALIST/TECHNOLOGIST

## 2024-08-09 ASSESSMENT — PAIN SCALES - GENERAL: PAINLEVEL_OUTOF10: 0 - NO PAIN

## 2024-08-09 ASSESSMENT — PAIN - FUNCTIONAL ASSESSMENT: PAIN_FUNCTIONAL_ASSESSMENT: 0-10

## 2024-08-09 NOTE — PROGRESS NOTES
"Physical Therapy  Physical Therapy Treatment    Patient Name: Annita Mcleod  MRN: [id number]  Today's Date: 8/9/2024  Time Calculation  Start Time: 1300  Stop Time: 1345  Time Calculation (min): 45 min    Insurance:  Visit number: 10 of [place]  Authorization info: NAN  Insurance Type: [facility] Medicare  Cert date start: 7/9/24  Cert date end: 10/7/24               General  Reason for Referral: lumbar radic  Referred By: DAVID Hanleyeason for Referral: lumbar radic   Referred By: RACHEL Perez MD    Current Problem  1. Lumbar radiculopathy  Follow Up In Physical Therapy          Precautions  STEADI Fall Risk Score (The score of 4 or more indicates an increased risk of falling): 5  Precautions Comment: none    Pain Assessment: 0-10  0-10 (Numeric) Pain Score: 0 - No pain    Subjective:   Patient arrived full weight bearing without a limp.  Patient notes some soreness into the 2nd day after last session.   Patient notes back pain has steadily improved.    HEP Performed:  Yes    Objective:   Function: ambulating fwb.  + L anterior innominate  TTP Slight proximal rectus   Hip ext 1\"   Quads 9\"     Treatments:   T5 NuStep- 5'  DBE 2x1'  4 kg KB iso SB R 30 sec, 2 kg KB Iso SB L 30s (tremor on L)  25# band R/L iso rot 30 s  Leg Press 30# 20x  Hamstring curl 10# 1x15  Supine trunk rotations R-L- 3'  Supine DKTC 3'  Iso hip ext 5\"hold/rest- 4'  Assisted IT band stretch 1'  QL wall stretch 1'  QL handheld massager 1'    Charges: TE 2, NME     Assessment:   Patient had short term shortness of breath with exercise.  Patient noted some increase in symptoms following the completion of her exercises. Patient tired after 15 hamstring curls.  Patient presents mild R QL tightness after exercise.     Plan:   Continue decreasing back discomfort increasing rom, flexibility, mobility, balance, strength, endurance and function as symptoms allow.    Constantino Potter, PTA  "

## 2024-08-16 ENCOUNTER — TREATMENT (OUTPATIENT)
Dept: PHYSICAL THERAPY | Facility: CLINIC | Age: 89
End: 2024-08-16
Payer: MEDICARE

## 2024-08-16 DIAGNOSIS — M54.16 LUMBAR RADICULOPATHY: Primary | ICD-10-CM

## 2024-08-16 PROCEDURE — 97112 NEUROMUSCULAR REEDUCATION: CPT | Mod: GP,CQ | Performed by: SPECIALIST/TECHNOLOGIST

## 2024-08-16 PROCEDURE — 97110 THERAPEUTIC EXERCISES: CPT | Mod: GP,CQ | Performed by: SPECIALIST/TECHNOLOGIST

## 2024-08-16 ASSESSMENT — PAIN SCALES - GENERAL: PAINLEVEL_OUTOF10: 0 - NO PAIN

## 2024-08-16 ASSESSMENT — PAIN - FUNCTIONAL ASSESSMENT: PAIN_FUNCTIONAL_ASSESSMENT: 0-10

## 2024-08-16 ASSESSMENT — PAIN DESCRIPTION - DESCRIPTORS: DESCRIPTORS: SORE

## 2024-08-16 NOTE — PROGRESS NOTES
"Physical Therapy  Physical Therapy Treatment    Patient Name: Annita Mcleod  MRN: [id number]  Today's Date: 8/16/2024  Time Calculation  Start Time: 1255  Stop Time: 1345  Time Calculation (min): 50 min    Insurance:  Visit number: 11 of [place]  Authorization info: NAN  Insurance Type: [facility] Medicare  Cert date start: 7/9/24  Cert date end: 10/7/24               General  Reason for Referral: lumbar radic  Referred By: DAVID Hanleyeason for Referral: lumbar radic   Referred By: RACHEL Perez MD    Current Problem  1. Lumbar radiculopathy  Follow Up In Physical Therapy          Precautions  STEADI Fall Risk Score (The score of 4 or more indicates an increased risk of falling): 5  Precautions Comment: none    Pain Assessment: 0-10  0-10 (Numeric) Pain Score: 0 - No pain  Pain Descriptors: Sore    Subjective:   Patient notes some mid back soreness.   Patient notes she has been walking 30-45 min daily.    HEP Performed:  Yes    Objective:   Function: ambulating fwb.  + L anterior innominate  TTP Slight proximal rectus   Hip ext 1\"   Quads 9\"     Treatments:   T5 NuStep- 5'  DBE 2x1'  2 kg KB iso SB R 60 sec, 2 kg KB Iso SB L 60s (tremor stopped at 48s on L)  Bravo 5# iso rot R/L 1'   Hip ext R/L 22#/22#  PB LTR  20x, hams 20x  PB Dead bug R/L Leg/Arm single 10x   SS R/L hams, pirif, HF, Quad 1' each   SS incline 1 min     Charges: TE 2, NME     Assessment:   Patient had short term shortness of breath with exercise.  Omitted Leg Press and hamstring curls, added hip extension and dead bug with PB.  Patient noted no increase in symptoms today.    Plan:   Continue decreasing back discomfort increasing rom, flexibility, mobility, balance, strength, endurance and function as symptoms allow.  Patient has one session prior to 3 week trip to  and has re-check upon her return.      Constantino Potter, SEBASTI[place]N  "

## 2024-08-21 ENCOUNTER — TREATMENT (OUTPATIENT)
Dept: PHYSICAL THERAPY | Facility: CLINIC | Age: 89
End: 2024-08-21
Payer: MEDICARE

## 2024-08-21 DIAGNOSIS — M54.16 LUMBAR RADICULOPATHY: ICD-10-CM

## 2024-08-21 PROCEDURE — 97112 NEUROMUSCULAR REEDUCATION: CPT | Mod: GP,CQ | Performed by: SPECIALIST/TECHNOLOGIST

## 2024-08-21 PROCEDURE — 97110 THERAPEUTIC EXERCISES: CPT | Mod: GP,CQ | Performed by: SPECIALIST/TECHNOLOGIST

## 2024-08-21 ASSESSMENT — PAIN - FUNCTIONAL ASSESSMENT: PAIN_FUNCTIONAL_ASSESSMENT: 0-10

## 2024-08-21 NOTE — PROGRESS NOTES
"Physical Therapy  Physical Therapy Treatment    Patient Name: Annita Mcleod  MRN: [id number]  Today's Date: 8/21/2024  Time Calculation  Start Time: 1330  Stop Time: 1415  Time Calculation (min): 45 min    Insurance:  Visit number: 12 of [place]  Authorization info: NAN  Insurance Type: [facility] Medicare  Cert date start: 7/9/24  Cert date end: 10/7/24               General  Reason for Referral: lumbar radic  Referred By: DAVID Hanleyeason for Referral: lumbar radic   Referred By: RACHEL Perez MD    Current Problem  1. Lumbar radiculopathy  Follow Up In Physical Therapy          Precautions  STEADI Fall Risk Score (The score of 4 or more indicates an increased risk of falling): 5  Precautions Comment: none    Pain Assessment: 0-10    Subjective:   Patient notes back soreness.   Patient notes she has been walking 30-45 min daily.  Patient has gotten a physioball and has started doing ball work as part of her HEP.  HEP Performed:  Yes    Objective:   Function: ambulating fwb.  + L anterior innominate  TTP Slight proximal rectus   Hip ext 1\"   Quads 9\"     Treatments:   T5 NuStep- 5'  DBE 2x1'  2 kg KB iso SB R 60 sec, 2 kg KB Iso SB L 60s (tremor stopped at 40s on L)  PB LTR  20x, hams 20x  PB Dead bug R/L Leg/Arm single 10x   Iso hip add 30x, iso hip ext R/L 20x   Crunch 10x  SS R/L hams, pirif, HF, Quad 1' each   SS incline 1 min     Charges: TE 2, NME     Assessment:   Patient had short term shortness of breath with exercise.  Patient noted good effort of exercise today.      Plan:   Continue decreasing back discomfort increasing rom, flexibility, mobility, balance, strength, endurance and function as symptoms allow.  Patient has 3 week trip to  starting this weekend and has re-check upon her return.      Constantino Potter, PTA  "

## 2024-08-22 ENCOUNTER — PATIENT OUTREACH (OUTPATIENT)
Dept: PRIMARY CARE | Facility: CLINIC | Age: 89
End: 2024-08-22
Payer: MEDICARE

## 2024-09-19 ENCOUNTER — TREATMENT (OUTPATIENT)
Dept: PHYSICAL THERAPY | Facility: CLINIC | Age: 89
End: 2024-09-19
Payer: MEDICARE

## 2024-09-19 DIAGNOSIS — M54.16 LUMBAR RADICULOPATHY: ICD-10-CM

## 2024-09-19 PROCEDURE — 97110 THERAPEUTIC EXERCISES: CPT | Mod: GP,CQ | Performed by: SPECIALIST/TECHNOLOGIST

## 2024-09-19 PROCEDURE — 97112 NEUROMUSCULAR REEDUCATION: CPT | Mod: GP,CQ | Performed by: SPECIALIST/TECHNOLOGIST

## 2024-09-19 ASSESSMENT — PAIN - FUNCTIONAL ASSESSMENT: PAIN_FUNCTIONAL_ASSESSMENT: 0-10

## 2024-09-19 NOTE — PROGRESS NOTES
"Physical Therapy  Physical Therapy Treatment    Patient Name: Annita Mcleod  MRN: [id number]  Today's Date: 9/19/2024  Time Calculation  Start Time: 1345  Stop Time: 1426  Time Calculation (min): 41 min    Insurance:  Visit number: 13 of [place]  Authorization info: NAN  Insurance Type: [facility] Medicare  Cert date start: 7/9/24  Cert date end: 10/7/24               General  Reason for Referral: lumbar radic  Referred By: DAVID Hanleyeason for Referral: lumbar radic   Referred By: RACHEL Perez MD    Current Problem  1. Lumbar radiculopathy  Follow Up In Physical Therapy          Precautions  STEADI Fall Risk Score (The score of 4 or more indicates an increased risk of falling): 5  Precautions Comment: none    Pain Assessment: 0-10  0-10 (Numeric) Pain Score:  (Not bad today, had pain yesterday)    Subjective:   Patient notes having a good trip to  and feeling better overall.  Patient notes having some pain yesterday (2 days after traveling home from ).   HEP Performed:  Yes    Objective:   Function: ambulating fwb.  + L anterior innominate  TTP Slight proximal rectus   Hip ext 1\"   Quads 9\"     Treatments:   Stepper L2 5 min   DBE 2x1.5'  2 kg KB iso SB R 60 sec, 2 kg KB Iso SB L 60s (tremor stopped at 40s on L)  PB LTR  20x, hams 20x  PB Dead bug R/L Leg/Arm single 10x   Iso hip add 30x, iso hip ext R/L 20x   Crunch 10x  SS R/L hams, pirif, HF, Quad 1' each   SS incline 1 min     Charges: TE 2, NME     Assessment:   Patient did not present any shortness of breath today.  Patient still had tremor on L throughout KB iso SB on L.      Plan:   Continue decreasing back discomfort increasing rom, flexibility, mobility, balance, strength, endurance and function as symptoms allow.  Patient has re-check next session on September 26, 2024 with Carrie Choudhury, PT        Constantino Potter, PTA  "

## 2024-09-20 DIAGNOSIS — I10 PRIMARY HYPERTENSION: ICD-10-CM

## 2024-09-24 RX ORDER — LOSARTAN POTASSIUM 50 MG/1
50 TABLET ORAL DAILY
Qty: 90 TABLET | Refills: 1 | Status: SHIPPED | OUTPATIENT
Start: 2024-09-24

## 2024-09-26 ENCOUNTER — TREATMENT (OUTPATIENT)
Dept: PHYSICAL THERAPY | Facility: CLINIC | Age: 89
End: 2024-09-26
Payer: MEDICARE

## 2024-09-26 DIAGNOSIS — M54.16 LUMBAR RADICULOPATHY: Primary | ICD-10-CM

## 2024-09-26 PROCEDURE — 97110 THERAPEUTIC EXERCISES: CPT | Mod: GP

## 2024-09-26 ASSESSMENT — PAIN SCALES - GENERAL: PAINLEVEL_OUTOF10: 1

## 2024-09-26 ASSESSMENT — PAIN - FUNCTIONAL ASSESSMENT: PAIN_FUNCTIONAL_ASSESSMENT: 0-10

## 2024-09-26 NOTE — PROGRESS NOTES
Physical Therapy  Physical Therapy Orthopedic Progress Note    Patient Name: Annita Mcleod  MRN: [id number]  Today's Date: 9/26/2024  Time Calculation  Start Time: 1330  Stop Time: 1413  Time Calculation (min): 43 min    Insurance:  Visit number: 14 of [facility] [employer]  Authorization info: No Auth Needed  Insurance Type: [facility] Medicare  Cert date start: 9/26/24       Cert date end: 12/24/24                 General  Reason for Referral: lumbar radic  Referred By: RACHEL Perez MD    Current Problem  1. Lumbar radiculopathy            Precautions  Precautions Comment: none      Subjective:    Patient reports the pain is no longer every day, now it is a couple times per week for a couple hours at a time. Has been functioning with ADLs much better. Goes to [place] for the winter in December.    Current Condition:   Better    Pain:  Pain Assessment: 0-10  0-10 (Numeric) Pain Score: 1    Self Reported Function (0-100%) = 80%  (100% being back to PLOF)    Objective:    Gait: normal gait pattern                     Balance: SLS-     R: 14 sec.           L: 10 sec.     Palpation: TTP across low back      Flexibility: mod tight B HS, quad, HF, gastroc     ROM     Lumbar AROM (%)     Flexion: 75%   Extension: 25%   (L) Side Bend: 50%   (R) Side Bend: 50%   (L) Rotation: 75%   (R) Rotation: 75%         Hip AROM (Degrees): R=L                                   Strength Testing (pain with all testing)     LE strength MMT          R         L  Hip flexion                   4+/5       4+/5  Hip extension              3+/5     3+/5  Hip abduction              4/5     4/5  Hip adduction              4-/5      4-/5  Hip IR                           4/5     4/5  Hip ER                          4/5     4/5  Knee extension            4/5      4/5  Knee flexion                 4/5       4/5            Outcome Measures: Updated 9/26/2024  Other Measures  Oswestry Disablity Index (BLANCHE): 17/50     EDUCATION: home exercise program, plan of care, activity  modifications, pain management, and injury pathology       Goals: Updated 9/26/2024  Active       PT Problem       STG (Met)       Start:  04/09/24    Expected End:  07/08/24    Resolved:  07/08/24    Patient will decrease pain to 0-2/10 in 4 weeks.   Patient will increase strength by >/= 1/2 mm grade in 4 weeks.  Oswestry: -6 in 4 weeks.               LTG (Progressing)       Start:  04/09/24    Expected End:  11/25/24       Patient will be able to walk >10 mins without increased pain in 8 weeks.- met  Patient will be able to ascend/descend stairs with step through pattern in 8 weeks.- met    Patient will increase LE flexibility to min tight in 8 weeks. - met  Patient will be able to maintain SLS >/= 10 seconds in 8 weeks.- progressing   Patient will be able to complete transitional activities without increased pain in 6 weeks.- progressing.  Patient will be able to clean home without increased pain in 6 weeks.- progressing  Patient will be able to stand and cook for 30 mins in 6 weeks.- progressing.             Plan of care was developed with input and agreement by the patient      Treatment Performed:  Recheck completed  Reviewed HEP and discussed plan moving forward  Access Code: [email or web address]    Exercises  - Supine Lower Trunk Rotation  - 2 x daily - 7 x weekly  - Supine Piriformis Stretch with Foot on Ground  - 2 x daily - 7 x weekly - 3 reps - 30 hold  - Supine Hamstring Stretch   - 2 x daily - 7 x weekly - 3 reps - 30 hold  - Supine Lower Trunk Rotation with Swiss Ball  - 1 x daily - 7 x weekly - 1 sets - 10 reps  - Dead Bug with Swiss Ball  - 1 x daily - 7 x weekly - 1 sets - 10 reps  - Supine Hip Adduction Isometric with Ball  - 1 x daily - 7 x weekly - 1 sets - 10 reps    Charges: TEx3    Assessment: Patient with significant improvement in LBP and radicular symptoms since beginning PT. ADLs and functional activities have become much easier and has gained independence back. Will have 2 more appts and then  transition to independent HEP.      Plan: Updated 9/26/2024    Planned Interventions include: therapeutic exercise, self-care home management, manual therapy, therapeutic activities, gait training, neuromuscular coordination, vasopneumatic, dry needling, aquatic therapy  Frequency: 1 x Week  Duration: 2 more visits    Carrie Choudhury, PT

## 2024-10-01 ENCOUNTER — TREATMENT (OUTPATIENT)
Dept: PHYSICAL THERAPY | Facility: CLINIC | Age: 89
End: 2024-10-01
Payer: MEDICARE

## 2024-10-01 DIAGNOSIS — M54.16 LUMBAR RADICULOPATHY: ICD-10-CM

## 2024-10-01 PROCEDURE — 97110 THERAPEUTIC EXERCISES: CPT | Mod: GP,CQ | Performed by: SPECIALIST/TECHNOLOGIST

## 2024-10-01 PROCEDURE — 97112 NEUROMUSCULAR REEDUCATION: CPT | Mod: GP,CQ | Performed by: SPECIALIST/TECHNOLOGIST

## 2024-10-01 ASSESSMENT — PAIN - FUNCTIONAL ASSESSMENT: PAIN_FUNCTIONAL_ASSESSMENT: 0-10

## 2024-10-01 ASSESSMENT — PAIN SCALES - GENERAL: PAINLEVEL_OUTOF10: 1

## 2024-10-01 NOTE — PROGRESS NOTES
"Physical Therapy  Physical Therapy Treatment    Patient Name: Annita Mcleod  MRN: [id number]  Today's Date: 10/1/2024  Time Calculation  Start Time: 1340  Stop Time: 1425  Time Calculation (min): 45 min    Insurance:  Visit number: 15 of MN  Authorization info: NAN  Insurance Type: [facility] Medicare  Cert date start: 7/9/24  Cert date end: 10/7/24             Cert extension 10/8/24 to 10/31/24    General  Reason for Referral: lumbar radic  Referred By: DAVID Hanleyeason for Referral: lumbar radic   Referred By: RACHEL Perez MD    Current Problem  1. Lumbar radiculopathy  Follow Up In Physical Therapy          Precautions  STEADI Fall Risk Score (The score of 4 or more indicates an increased risk of falling): 5  Precautions Comment: none    Pain Assessment: 0-10  0-10 (Numeric) Pain Score: 1    Subjective:   Patient notes some pain on R side that started 2 days ago.  HEP Performed:  Yes    Objective:   Function: ambulating fwb.  + L anterior innominate  TTP Slight proximal rectus   Hip ext 1\"   Quads 9\"     Treatments:   Stepper L2 5 min   DBE 2x1.5'  2 kg KB iso SB R 60 sec, 2 kg KB Iso SB L 60s (tremor stopped at 40s on L)  PB LTR  20x, hams 20x  PB Dead bug R/L Leg/Arm single 10x   Iso hip add 30x, iso hip ext R/L 20x   Crunch 10x  SS R/L hams, pirif, HF, Quad 1' each   SS incline 1 min     Charges: TE 2, NME     Assessment:   Patient tolerated intensity without problems.  Patient breathing hard after core work.  Patient did have intermittent tremor throughout L side iso SB.      Plan:   Continue decreasing back discomfort increasing rom, flexibility, mobility, balance, strength, endurance and function as symptoms allow.  Patient has one session left postponed to 10/25 due to ride being out of town.       Constantino Potter, PTA  "

## 2024-10-02 ENCOUNTER — APPOINTMENT (OUTPATIENT)
Dept: PHYSICAL THERAPY | Facility: CLINIC | Age: 89
End: 2024-10-02
Payer: MEDICARE

## 2024-10-04 ENCOUNTER — LAB (OUTPATIENT)
Dept: LAB | Facility: LAB | Age: 89
End: 2024-10-04
Payer: MEDICARE

## 2024-10-04 DIAGNOSIS — E03.9 HYPOTHYROIDISM, UNSPECIFIED TYPE: ICD-10-CM

## 2024-10-04 LAB — TSH SERPL-ACNC: 3.11 MIU/L (ref 0.44–3.98)

## 2024-10-04 PROCEDURE — 36415 COLL VENOUS BLD VENIPUNCTURE: CPT

## 2024-10-04 PROCEDURE — 84443 ASSAY THYROID STIM HORMONE: CPT

## 2024-10-11 ENCOUNTER — APPOINTMENT (OUTPATIENT)
Dept: PRIMARY CARE | Facility: CLINIC | Age: 89
End: 2024-10-11
Payer: MEDICARE

## 2024-10-11 ENCOUNTER — APPOINTMENT (OUTPATIENT)
Dept: PHYSICAL THERAPY | Facility: CLINIC | Age: 89
End: 2024-10-11
Payer: MEDICARE

## 2024-10-14 ENCOUNTER — APPOINTMENT (OUTPATIENT)
Dept: PRIMARY CARE | Facility: CLINIC | Age: 89
End: 2024-10-14
Payer: MEDICARE

## 2024-10-16 ENCOUNTER — APPOINTMENT (OUTPATIENT)
Dept: PULMONOLOGY | Facility: CLINIC | Age: 89
End: 2024-10-16
Payer: MEDICARE

## 2024-10-19 DIAGNOSIS — E03.9 HYPOTHYROIDISM, UNSPECIFIED TYPE: ICD-10-CM

## 2024-10-22 RX ORDER — LEVOTHYROXINE SODIUM 50 UG/1
50 TABLET ORAL DAILY
Qty: 90 TABLET | Refills: 3 | Status: SHIPPED | OUTPATIENT
Start: 2024-10-22

## 2024-10-25 ENCOUNTER — APPOINTMENT (OUTPATIENT)
Dept: PHYSICAL THERAPY | Facility: CLINIC | Age: 89
End: 2024-10-25
Payer: MEDICARE

## 2024-10-25 DIAGNOSIS — M54.16 LUMBAR RADICULOPATHY: ICD-10-CM

## 2024-10-25 PROCEDURE — 97110 THERAPEUTIC EXERCISES: CPT | Mod: GP,CQ | Performed by: SPECIALIST/TECHNOLOGIST

## 2024-10-25 PROCEDURE — 97112 NEUROMUSCULAR REEDUCATION: CPT | Mod: GP,CQ | Performed by: SPECIALIST/TECHNOLOGIST

## 2024-10-25 ASSESSMENT — PAIN SCALES - GENERAL: PAINLEVEL_OUTOF10: 1

## 2024-10-25 ASSESSMENT — PAIN - FUNCTIONAL ASSESSMENT: PAIN_FUNCTIONAL_ASSESSMENT: 0-10

## 2024-10-29 ENCOUNTER — OFFICE VISIT (OUTPATIENT)
Dept: PULMONOLOGY | Facility: CLINIC | Age: 89
End: 2024-10-29
Payer: MEDICARE

## 2024-10-29 VITALS
SYSTOLIC BLOOD PRESSURE: 100 MMHG | HEART RATE: 67 BPM | OXYGEN SATURATION: 98 % | DIASTOLIC BLOOD PRESSURE: 59 MMHG | WEIGHT: 123 LBS | BODY MASS INDEX: 22.5 KG/M2 | RESPIRATION RATE: 16 BRPM | TEMPERATURE: 97.4 F

## 2024-10-29 DIAGNOSIS — J45.909 ASTHMA, UNSPECIFIED ASTHMA SEVERITY, UNSPECIFIED WHETHER COMPLICATED, UNSPECIFIED WHETHER PERSISTENT (HHS-HCC): Primary | ICD-10-CM

## 2024-10-29 PROCEDURE — 1036F TOBACCO NON-USER: CPT | Performed by: INTERNAL MEDICINE

## 2024-10-29 PROCEDURE — 3074F SYST BP LT 130 MM HG: CPT | Performed by: INTERNAL MEDICINE

## 2024-10-29 PROCEDURE — 99214 OFFICE O/P EST MOD 30 MIN: CPT | Performed by: INTERNAL MEDICINE

## 2024-10-29 PROCEDURE — 3078F DIAST BP <80 MM HG: CPT | Performed by: INTERNAL MEDICINE

## 2024-10-29 PROCEDURE — 1160F RVW MEDS BY RX/DR IN RCRD: CPT | Performed by: INTERNAL MEDICINE

## 2024-10-29 PROCEDURE — 1159F MED LIST DOCD IN RCRD: CPT | Performed by: INTERNAL MEDICINE

## 2024-10-29 RX ORDER — ALBUTEROL SULFATE 0.83 MG/ML
2.5 SOLUTION RESPIRATORY (INHALATION) EVERY 6 HOURS PRN
Qty: 150 ML | Refills: 11 | Status: SHIPPED | OUTPATIENT
Start: 2024-10-29 | End: 2025-10-29

## 2024-10-29 RX ORDER — CHOLESTYRAMINE 4 G/9G
1 POWDER, FOR SUSPENSION ORAL 2 TIMES DAILY
COMMUNITY

## 2024-10-29 RX ORDER — SPIRONOLACTONE 25 MG/1
25 TABLET ORAL DAILY
COMMUNITY

## 2024-10-29 ASSESSMENT — ENCOUNTER SYMPTOMS
HEADACHES: 0
FEVER: 0
AGITATION: 0
TREMORS: 0
DIZZINESS: 0
LIGHT-HEADEDNESS: 0
SINUS PRESSURE: 0
WEAKNESS: 0
JOINT SWELLING: 0
ABDOMINAL PAIN: 0
STRIDOR: 0
HEMATURIA: 0
APNEA: 0
COUGH: 1
ARTHRALGIAS: 0
UNEXPECTED WEIGHT CHANGE: 0
NUMBNESS: 0
FACIAL SWELLING: 0
RHINORRHEA: 0
ABDOMINAL DISTENTION: 0
FREQUENCY: 0
SLEEP DISTURBANCE: 0
EYE DISCHARGE: 0
SHORTNESS OF BREATH: 1
NERVOUS/ANXIOUS: 0
SPEECH DIFFICULTY: 0
NAUSEA: 0
CONSTIPATION: 0
FATIGUE: 0
WHEEZING: 0
PALPITATIONS: 0
ADENOPATHY: 0
SINUS PAIN: 0
CHOKING: 0
DIFFICULTY URINATING: 0
DYSURIA: 0
EYE REDNESS: 0
BRUISES/BLEEDS EASILY: 0

## 2024-10-31 ENCOUNTER — APPOINTMENT (OUTPATIENT)
Dept: PRIMARY CARE | Facility: CLINIC | Age: 89
End: 2024-10-31
Payer: MEDICARE

## 2024-10-31 VITALS
BODY MASS INDEX: 22.45 KG/M2 | DIASTOLIC BLOOD PRESSURE: 67 MMHG | WEIGHT: 122 LBS | HEART RATE: 59 BPM | HEIGHT: 62 IN | SYSTOLIC BLOOD PRESSURE: 129 MMHG | OXYGEN SATURATION: 98 %

## 2024-10-31 DIAGNOSIS — E03.9 HYPOTHYROIDISM, UNSPECIFIED TYPE: Primary | ICD-10-CM

## 2024-10-31 DIAGNOSIS — Z23 NEED FOR INFLUENZA VACCINATION: ICD-10-CM

## 2024-10-31 DIAGNOSIS — N18.31 CHRONIC KIDNEY DISEASE, STAGE 3A (MULTI): ICD-10-CM

## 2024-10-31 PROCEDURE — 90656 IIV3 VACC NO PRSV 0.5 ML IM: CPT | Performed by: STUDENT IN AN ORGANIZED HEALTH CARE EDUCATION/TRAINING PROGRAM

## 2024-10-31 PROCEDURE — 1036F TOBACCO NON-USER: CPT | Performed by: STUDENT IN AN ORGANIZED HEALTH CARE EDUCATION/TRAINING PROGRAM

## 2024-10-31 PROCEDURE — 3074F SYST BP LT 130 MM HG: CPT | Performed by: STUDENT IN AN ORGANIZED HEALTH CARE EDUCATION/TRAINING PROGRAM

## 2024-10-31 PROCEDURE — G2211 COMPLEX E/M VISIT ADD ON: HCPCS | Performed by: STUDENT IN AN ORGANIZED HEALTH CARE EDUCATION/TRAINING PROGRAM

## 2024-10-31 PROCEDURE — 1159F MED LIST DOCD IN RCRD: CPT | Performed by: STUDENT IN AN ORGANIZED HEALTH CARE EDUCATION/TRAINING PROGRAM

## 2024-10-31 PROCEDURE — 1160F RVW MEDS BY RX/DR IN RCRD: CPT | Performed by: STUDENT IN AN ORGANIZED HEALTH CARE EDUCATION/TRAINING PROGRAM

## 2024-10-31 PROCEDURE — G0008 ADMIN INFLUENZA VIRUS VAC: HCPCS | Performed by: STUDENT IN AN ORGANIZED HEALTH CARE EDUCATION/TRAINING PROGRAM

## 2024-10-31 PROCEDURE — 99214 OFFICE O/P EST MOD 30 MIN: CPT | Performed by: STUDENT IN AN ORGANIZED HEALTH CARE EDUCATION/TRAINING PROGRAM

## 2024-10-31 PROCEDURE — 3078F DIAST BP <80 MM HG: CPT | Performed by: STUDENT IN AN ORGANIZED HEALTH CARE EDUCATION/TRAINING PROGRAM

## 2024-11-04 DIAGNOSIS — F41.1 ANXIETY STATE: Primary | ICD-10-CM

## 2024-11-06 RX ORDER — SERTRALINE HYDROCHLORIDE 25 MG/1
25 TABLET, FILM COATED ORAL DAILY
Qty: 90 TABLET | Refills: 3 | Status: SHIPPED | OUTPATIENT
Start: 2024-11-06

## 2024-11-18 NOTE — PROGRESS NOTES
"Physical Therapy  Physical Therapy Treatment    Patient Name: Annita Mcleod  MRN: [id number]  Today's Date: 10/25/2024  Time Calculation  Start Time: 1300  Stop Time: 1345  Time Calculation (min): 45 min    Insurance:  Visit number: 16 of MN  Authorization info: NAN  Insurance Type: [facility] Medicare  Cert date start: 7/9/24  Cert date end: 10/7/24             Cert extension 10/8/24 to 10/31/24    General  Reason for Referral: lumbar radic  Referred By: DAVID Hanleyeason for Referral: lumbar radic   Referred By: RACHEL Perez MD    Current Problem  1. Lumbar radiculopathy  Follow Up In Physical Therapy          Precautions  STEADI Fall Risk Score (The score of 4 or more indicates an increased risk of falling): 5  Precautions Comment: none    Pain Assessment: 0-10  0-10 (Numeric) Pain Score: 1    Subjective:   Patient notes feeling much better since last session   HEP Performed:  Yes    Objective:   Function: ambulating fwb.  + L anterior innominate  TTP Slight proximal rectus   Hip ext 1\"   Quads 9\"     Treatments:   Stepper L2 5 min   DBE 2x1.5'  2 kg KB iso SB R 60 sec, 2 kg KB Iso SB L 60s (tremor stopped at 40s on L)  PB LTR  20x, hams 20x  PB Dead bug R/L Leg/Arm single 10x   Iso hip add 30x, iso hip ext R/L 20x   Crunch 10x  SS R/L hams, pirif, HF, Quad 1' each   SS incline 1 min     Charges: TE 2, NME     Assessment:   Patient tolerated intensity without problems.  Patient breathing hard after core work.  Patient did have intermittent tremor throughout L side iso SB.      Plan:   Continue decreasing back discomfort increasing rom, flexibility, mobility, balance, strength, endurance and function as symptoms allow.  Patient thinks today will be last session. Patient to have daughter call to discuss plan (discharge vs re-check).  Patient will be out of town most of November and in [place] Dec into March.      Constantino Potter PTA  "
anxiety

## 2024-11-20 ENCOUNTER — DOCUMENTATION (OUTPATIENT)
Dept: PHYSICAL THERAPY | Facility: CLINIC | Age: 89
End: 2024-11-20
Payer: MEDICARE

## 2024-11-20 DIAGNOSIS — M54.16 LUMBAR RADICULOPATHY: ICD-10-CM

## 2024-11-20 NOTE — PROGRESS NOTES
Physical Therapy    Discharge Summary    Name: Annita Mcleod  MRN: [id number]  : 6/10/1932  Date: 24    Discharge Summary: PT    Discharge Information: Date of discharge 24, Date of last visit 10/25/24, Date of evaluation 24, Number of attended visits 16, Referred by Dr. Perez, and Referred for Lumbar radiculopathy    Therapy Summary: Worked on LE/core strength, flexibility, balance, WB endurance    Discharge Status: Patient with significant improvement in radicular symptoms.     Rehab Discharge Reason: Other : leaving [place] for the winter

## 2024-12-05 ENCOUNTER — APPOINTMENT (OUTPATIENT)
Dept: PRIMARY CARE | Facility: CLINIC | Age: 89
End: 2024-12-05
Payer: MEDICARE

## 2024-12-09 ENCOUNTER — OFFICE VISIT (OUTPATIENT)
Dept: PRIMARY CARE | Facility: CLINIC | Age: 89
End: 2024-12-09
Payer: MEDICARE

## 2024-12-09 VITALS
DIASTOLIC BLOOD PRESSURE: 70 MMHG | WEIGHT: 123.6 LBS | HEART RATE: 65 BPM | SYSTOLIC BLOOD PRESSURE: 127 MMHG | HEIGHT: 62 IN | BODY MASS INDEX: 22.74 KG/M2 | OXYGEN SATURATION: 98 %

## 2024-12-09 DIAGNOSIS — Z78.0 ASYMPTOMATIC MENOPAUSE: ICD-10-CM

## 2024-12-09 DIAGNOSIS — Z00.00 MEDICARE ANNUAL WELLNESS VISIT, SUBSEQUENT: Primary | ICD-10-CM

## 2024-12-09 DIAGNOSIS — R25.1 TREMOR OF LEFT HAND: ICD-10-CM

## 2024-12-09 DIAGNOSIS — M47.27 LUMBOSACRAL SPONDYLOSIS WITH RADICULOPATHY: ICD-10-CM

## 2024-12-09 PROCEDURE — 1159F MED LIST DOCD IN RCRD: CPT | Performed by: STUDENT IN AN ORGANIZED HEALTH CARE EDUCATION/TRAINING PROGRAM

## 2024-12-09 PROCEDURE — 1160F RVW MEDS BY RX/DR IN RCRD: CPT | Performed by: STUDENT IN AN ORGANIZED HEALTH CARE EDUCATION/TRAINING PROGRAM

## 2024-12-09 PROCEDURE — 1036F TOBACCO NON-USER: CPT | Performed by: STUDENT IN AN ORGANIZED HEALTH CARE EDUCATION/TRAINING PROGRAM

## 2024-12-09 PROCEDURE — 99214 OFFICE O/P EST MOD 30 MIN: CPT | Performed by: STUDENT IN AN ORGANIZED HEALTH CARE EDUCATION/TRAINING PROGRAM

## 2024-12-09 PROCEDURE — G0439 PPPS, SUBSEQ VISIT: HCPCS | Performed by: STUDENT IN AN ORGANIZED HEALTH CARE EDUCATION/TRAINING PROGRAM

## 2024-12-09 PROCEDURE — 3074F SYST BP LT 130 MM HG: CPT | Performed by: STUDENT IN AN ORGANIZED HEALTH CARE EDUCATION/TRAINING PROGRAM

## 2024-12-09 PROCEDURE — 3078F DIAST BP <80 MM HG: CPT | Performed by: STUDENT IN AN ORGANIZED HEALTH CARE EDUCATION/TRAINING PROGRAM

## 2024-12-09 PROCEDURE — 1170F FXNL STATUS ASSESSED: CPT | Performed by: STUDENT IN AN ORGANIZED HEALTH CARE EDUCATION/TRAINING PROGRAM

## 2024-12-09 PROCEDURE — 1124F ACP DISCUSS-NO DSCNMKR DOCD: CPT | Performed by: STUDENT IN AN ORGANIZED HEALTH CARE EDUCATION/TRAINING PROGRAM

## 2024-12-09 ASSESSMENT — PATIENT HEALTH QUESTIONNAIRE - PHQ9
1. LITTLE INTEREST OR PLEASURE IN DOING THINGS: NOT AT ALL
SUM OF ALL RESPONSES TO PHQ9 QUESTIONS 1 AND 2: 0
2. FEELING DOWN, DEPRESSED OR HOPELESS: NOT AT ALL

## 2024-12-09 ASSESSMENT — ACTIVITIES OF DAILY LIVING (ADL)
GROCERY_SHOPPING: INDEPENDENT
TAKING_MEDICATION: INDEPENDENT
DOING_HOUSEWORK: INDEPENDENT
DRESSING: INDEPENDENT
BATHING: INDEPENDENT
MANAGING_FINANCES: INDEPENDENT

## 2024-12-09 NOTE — PROGRESS NOTES
"Subjective   Patient ID: Annita Mcleod is a 89 y.o. female who presents for Follow-up (Request for PT, tremors in arms.).        HPI                Visit Vitals  /70   Pulse 65   Ht 1.575 m (5' 2\")   Wt 56.1 kg (123 lb 9.6 oz)   SpO2 98%   BMI 22.61 kg/m²   Smoking Status Never   BSA 1.57 m²      No LMP recorded.   Current Outpatient Medications   Medication Instructions    albuterol 90 mcg/actuation inhaler 2 puffs, inhalation, Every 6 hours PRN    albuterol 2.5 mg, nebulization, Every 6 hours PRN    aspirin 81 mg, Daily    atorvastatin (Lipitor) 40 mg tablet 1 tablet, Nightly    cholecalciferol (VITAMIN D-3) 25 mcg, Daily    cholestyramine (Questran) 4 gram packet 1 packet, 2 times daily    clopidogrel (PLAVIX) 75 mg, oral, Daily    fluticasone (Flovent) 110 mcg/actuation inhaler 2 puffs, 2 times daily RT    levothyroxine (SYNTHROID, LEVOXYL) 50 mcg, oral, Daily    losartan (COZAAR) 50 mg, oral, Daily    metoprolol tartrate (LOPRESSOR) 6.25 mg, 2 times daily    nitroglycerin (NITROSTAT) 0.4 mg, Every 5 min PRN    ranolazine (RANEXA) 500 mg, 2 times daily    sertraline (ZOLOFT) 25 mg, oral, Daily    spironolactone (ALDACTONE) 25 mg, Daily      Social History     Tobacco Use    Smoking status: Never    Smokeless tobacco: Never   Substance Use Topics    Alcohol use: Not Currently        Review of Systems    Constitutional : No feeling poorly / fevers/ chills / night sweats/ fatigue   Cardiovascular : No CP /Palpitations/ lower extremity edema / syncope   Respiratory : No Cough /GODOY/Dyspnea at rest   Gastrointestinal : No abd pain / N/V  No bloody stools/ melena / constipation  Endo : No polyuria/polydipsia/ muscle weakness / sluggishness   CNS: No confusion / HA/ tingling/ numbness/ weakness of extremities  Psychiatric: No anxiety/ depression/ SI/HI    All other systems have been reviewed and are negative for complaint       Physical Exam    Constitutional : Vitals reviewed. Alert and in no " distress  Cardiovascular : RRR, Normal S1, S2, No pericardial rub/ gallop, no peripheral edema   Pulmonary: No respiratory distress, CTAB   MSK : Normal gait and station , strength and tone   Skin: Warm to touch ,  normal skin turgor   Neurologic : CNs 2-12 grossly intact , no obvious FNDs  Psych : A,Ox3, normal mood and affect      Assessment/Plan                   Conditions addressed and mgmt as noted above.  Pertinent labs, images/ imaging reports , chart review was done .   Age appropriate labs / labs for mgmt of chronic medical conditions ordered, further mgmt pending the results.       This note is intended for the physician writing it, as well as to communicate findings to other healthcare professionals. These notes use medical lexicon that may be misunderstood by non medical persons. Therefore, interpretations of medical notes and terminology should be approached with caution.

## 2024-12-09 NOTE — PROGRESS NOTES
Subjective   Reason for Visit: Annita Mcleod is an 89 y.o. female here for a Medicare Wellness visit.     Past Medical, Surgical, and Family History reviewed and updated in chart.    Reviewed all medications by prescribing practitioner or clinical pharmacist (such as prescriptions, OTCs, herbal therapies and supplements) and documented in the medical record.    HPI    Daughter is the historian   Tremor of the left hand  X 2-3 years   No changes in gait    Will be leaving to [place]  for the winter             Patient Care Team:  Desiree Perez MD as PCP - General (Family Medicine)  Desiree Perez MD as PCP - [employer] PCP     Current Outpatient Medications   Medication Instructions    albuterol 90 mcg/actuation inhaler 2 puffs, inhalation, Every 6 hours PRN    albuterol 2.5 mg, nebulization, Every 6 hours PRN    aspirin 81 mg, Daily    atorvastatin (Lipitor) 40 mg tablet 1 tablet, Nightly    cholecalciferol (VITAMIN D-3) 25 mcg, Daily    cholestyramine (Questran) 4 gram packet 1 packet, 2 times daily    clopidogrel (PLAVIX) 75 mg, oral, Daily    fluticasone (Flovent) 110 mcg/actuation inhaler 2 puffs, 2 times daily RT    levothyroxine (SYNTHROID, LEVOXYL) 50 mcg, oral, Daily    losartan (COZAAR) 50 mg, oral, Daily    metoprolol tartrate (LOPRESSOR) 6.25 mg, 2 times daily    nitroglycerin (NITROSTAT) 0.4 mg, Every 5 min PRN    ranolazine (RANEXA) 500 mg, 2 times daily    sertraline (ZOLOFT) 25 mg, oral, Daily    spironolactone (ALDACTONE) 25 mg, Daily        Social History     Tobacco Use    Smoking status: Never    Smokeless tobacco: Never   Substance Use Topics    Alcohol use: Not Currently        Review of Systems  Constitutional: no chills, no fever and no night sweats.     Eyes: no blurred vision and no eyesight problems.     ENT: no hearing loss, no nasal congestion, no nasal discharge, no hoarseness and no sore throat.     Cardiovascular: no chest pain, no intermittent leg  "claudication, no lower extremity edema, no palpitations and no syncope.     Respiratory: no cough, no shortness of breath during exertion, no shortness of breath at rest and no wheezing.     Gastrointestinal: no abdominal pain, no blood in stools, no constipation, no diarrhea, no melena, no nausea, no rectal pain and no vomiting.     Genitourinary: no dysuria, no change in urinary frequency, no urinary hesitancy, no feelings of urinary urgency and no vaginal discharge.     Musculoskeletal: no arthralgias, no back pain and no myalgias.     Integumentary: no new skin lesions and no rashes.     Neurological: no difficulty walking, no headache, no limb weakness, no numbness and no tingling.     Psychiatric: no anxiety, no depression, no anhedonia and no substance use disorders.     Endocrine: no recent weight gain and no recent weight loss.     Hematologic/Lymphatic: no tendency for easy bruising and no swollen glands.          All other systems have been reviewed and are negative for complaint.    Objective   Vitals:  /70   Pulse 65   Ht 1.575 m (5' 2\")   Wt 56.1 kg (123 lb 9.6 oz)   SpO2 98%   BMI 22.61 kg/m²       Physical Exam    Constitutional: Alert and in no acute distress. Well developed, well nourished.     Eyes: Normal external exam. Pupils were equal in size, round, reactive to light (PERRL) with normal accommodation and extraocular movements intact (EOMI).     Ears, Nose, Mouth, and Throat: External inspection of ears and nose: Normal.  Otoscopic examination: Normal.      Neck: No neck mass was observed. Supple.     Cardiovascular: Heart rate and rhythm were normal, normal S1 and S2, no gallops, no murmurs and no pericardial rub    Pulmonary: No respiratory distress. Clear bilateral breath sounds.     Abdomen: Soft nontender; no abdominal mass palpated. No organomegaly.     Musculoskeletal: No joint swelling seen, normal movements of all extremities. Range of motion: Normal.  Muscle strength/tone: " Normal.          Neurologic: Deep tendon reflexes were 2+ and symmetric. Sensation: Normal.     Psychiatric: Judgment and insight: Intact. Mood and affect: Normal.      Assessment/Plan   Problem List Items Addressed This Visit    None  Visit Diagnoses       Medicare annual wellness visit, subsequent    -  Primary    Tremor of left hand        Relevant Orders    Referral to Neurology    Lumbosacral spondylosis with radiculopathy        Relevant Orders    Referral to Physical Therapy    Asymptomatic menopause        Relevant Orders    XR DEXA bone density          89 y/o female with CAD s/p 2005 with MI in 2021 s/p PCI , mild - moderate depression , hypothyroidism and paresthesia of LE     Unilateral tremor of left hand with no  rigidity , akinesia   ? Movement disorder   Not sure if it gets better with alcohol since she drinks very minimally or occasionally  Neuro ref     Continue other meds     Immunizations : utd  Influenza :   Prevnar 20 :   Pneumovax 23:  Shingles:     Cancer screenings:   Mammogram :   Screening  not indicated  Cervical cancer:  Screening  not indicated  Colon cancer:     Screening not indicated  Lung cancer :     Screening  not indicated  HIV screening:   Screening not indicated  Osteoporosis :   Screening     RTO in 6m or sooner if needed     This note is intended for the physician writing it, as well as to communicate findings to other healthcare professionals. These notes use medical lexicon that may be misunderstood by non medical persons. Therefore, interpretations of medical notes and terminology should be approached with caution.

## 2025-04-03 DIAGNOSIS — I10 PRIMARY HYPERTENSION: ICD-10-CM

## 2025-04-03 RX ORDER — LOSARTAN POTASSIUM 50 MG/1
50 TABLET ORAL DAILY
Qty: 90 TABLET | Refills: 1 | Status: SHIPPED | OUTPATIENT
Start: 2025-04-03

## 2025-04-03 NOTE — PROGRESS NOTES
Physical Therapy  Physical Therapy Orthopedic Evaluation    Patient Name: Annita Mcleod  MRN: [id number]  Today's Date: 4/4/2025  Time Calculation  Start Time: 0930  Stop Time: 1008  Time Calculation (min): 38 min    Insurance:  Visit number: 1 of ?  Authorization info: no auth needed  Insurance Type: [facility] Medicare  Cert date start: 4/4/25        Cert date end: 7/4/25                General:  Reason for visit: LBP and lumbar radiculopathy  Referred by: Dr. Desiree Perez    Current Problem  1. Lumbar radiculopathy  Follow Up In Physical Therapy      2. Lumbosacral spondylosis with radiculopathy  Referral to Physical Therapy            Precautions  STEADI Fall Risk Score (The score of 4 or more indicates an increased risk of falling): 9  Precautions Comment: none    Medical History Form: Reviewed (scanned into chart)    Subjective:     Chief Complaint: Patient presents to clinic with LBP and lumbar radiculopathy L>R with h/o DDD. Has tried 1 epidural injection without improvement. Stopped taking gabapentin because it was not helping. Exercise in the water is all she can tolerate at this time. Has been here in the past for aquatic PT and had good success transitioning to land after aquatic therapy last year.  Onset Date: 1/1/21    Current Condition:   Worse    Pain:  Pain Assessment: 0-10  0-10 (Numeric) Pain Score: 8  Location: across low back and over to left side and down B thighs  Description: sharp  Aggravating Factors: sitting  Relieving Factors: Tylenol, Advil, lidocaine patch, heating pad     Relevant Information (PMH & Previous Tests/Imaging): MRI last year  Previous Interventions/Treatments: None    Prior Level of Function (PLOF)  Patient previously independent with all ADLs  Exercise/Physical Activity: none  Work/School: retired     Patients Living Environment: Reviewed and no concern  Stairs: no stairs    Primary Language: English    Patient's Goal(s) for Therapy: Decrease pain and increase quality of  life    Red Flags: Do you have any of the following? No  Fever/chills, unexplained weight changes, dizziness/fainting, unexplained change in bowel or bladder functions, unexplained malaise or muscle weakness, night pain/sweats, numbness or tingling    Objective:    Gait: decreased lavell      Balance: SLS-     R: 0 sec.           L: 0 sec.    Palpation: TTP across low back and left glute    Flexibility: mod tight B HS, quad, HF, gastroc    ROM    Lumbar AROM (%)    Flexion: 50% P  Extension: 10% P  (L) Side Bend: 10% P  (R) Side Bend: 10% P  (L) Rotation: 75% P  (R) Rotation: 50% P      Hip AROM (Degrees): R=L       Strength Testing (pain with all testing)    LE strength MMT  R L  Hip flexion  4/5 4/5  Hip extension  2+/5 2+/5  Hip abduction  3+/5 3+/5  Hip adduction  3-/5 3-/5  Hip IR   3+/5 3+/5  Hip ER   3+/5 3+/5  Knee extension 4-/5 4-/5  Knee flexion  4/5 4-/5          Special Tests    Slump Test: positive B    Radicular Symptoms: B thighs      Outcome Measures:        EDUCATION: home exercise program, plan of care, activity modifications, pain management, and injury pathology       Goals: Set and discussed today  Active       PT Problem       PT Goals       Start:  04/04/25    Expected End:  07/03/25       STG:  Patient will decrease pain to 0-2/10 in 4 weeks.   Patient will increase strength by >/= 1/2 mm grade in 4 weeks.  Oswestry: -6 in 4 weeks.    LTG:  Patient will be able to walk >10 mins without increased pain in 8 weeks.  Patient will be able to ascend/descend stairs with step through pattern in 8 weeks.   Patient will increase LE flexibility to min tight in 8 weeks.   Patient will be able to maintain SLS >/= 10 seconds in 8 weeks.                    Plan of care was developed with input and agreement by the patient      Treatment Performed:  Access Code: [email or web address]    Reviewed below HEP:    Exercises  - Supine Lower Trunk Rotation  - 2 x daily - 7 x weekly  - Supine Piriformis Stretch with Foot on  Ground  - 2 x daily - 7 x weekly - 3 reps - 30 hold  - Supine Hamstring Stretch   - 2 x daily - 7 x weekly - 3 reps - 30 hold  - Supine Transversus Abdominis Bracing - Hands on Stomach  - 2 x daily - 7 x weekly - 2 sets - 10 reps - 5 hold    Charges: Low complexity eval, Self care, TE    Assessment: Patient presents with signs and symptoms consistent with LBP and lumbar radiculopathy, resulting in limited participation in pain-free ADLs and inability to perform at their prior level of function. Pt would benefit from physical therapy to address the impairments found & listed previously in the objective section in order to return to safe and pain-free ADLs and prior level of function. Patient will have 4 aquatic sessions and then she will decide if she wants to transition to land or a community pool.       Plan:     Planned Interventions include: therapeutic exercise, self-care home management, manual therapy, therapeutic activities, gait training, neuromuscular coordination, vasopneumatic, dry needling, aquatic therapy  Frequency: 1 x Week  Duration: 12 weeks    Carrie Choudhury, PT

## 2025-04-04 ENCOUNTER — EVALUATION (OUTPATIENT)
Dept: PHYSICAL THERAPY | Facility: CLINIC | Age: OVER 89
End: 2025-04-04
Payer: MEDICARE

## 2025-04-04 DIAGNOSIS — M54.16 LUMBAR RADICULOPATHY: Primary | ICD-10-CM

## 2025-04-04 DIAGNOSIS — M47.27 LUMBOSACRAL SPONDYLOSIS WITH RADICULOPATHY: ICD-10-CM

## 2025-04-04 PROCEDURE — 97535 SELF CARE MNGMENT TRAINING: CPT | Mod: GP

## 2025-04-04 PROCEDURE — 97110 THERAPEUTIC EXERCISES: CPT | Mod: GP

## 2025-04-04 PROCEDURE — 97161 PT EVAL LOW COMPLEX 20 MIN: CPT | Mod: GP

## 2025-04-04 ASSESSMENT — ENCOUNTER SYMPTOMS
LOSS OF SENSATION IN FEET: 1
OCCASIONAL FEELINGS OF UNSTEADINESS: 1
DEPRESSION: 1

## 2025-04-04 ASSESSMENT — PAIN SCALES - GENERAL: PAINLEVEL_OUTOF10: 8

## 2025-04-04 ASSESSMENT — PAIN - FUNCTIONAL ASSESSMENT: PAIN_FUNCTIONAL_ASSESSMENT: 0-10

## 2025-04-10 ENCOUNTER — HOSPITAL ENCOUNTER (OUTPATIENT)
Dept: RADIOLOGY | Facility: CLINIC | Age: OVER 89
Discharge: HOME | End: 2025-04-10
Payer: MEDICARE

## 2025-04-10 ENCOUNTER — TREATMENT (OUTPATIENT)
Dept: PHYSICAL THERAPY | Facility: CLINIC | Age: OVER 89
End: 2025-04-10
Payer: MEDICARE

## 2025-04-10 DIAGNOSIS — Z78.0 ASYMPTOMATIC MENOPAUSE: ICD-10-CM

## 2025-04-10 DIAGNOSIS — M54.16 LUMBAR RADICULOPATHY: Primary | ICD-10-CM

## 2025-04-10 PROCEDURE — 97113 AQUATIC THERAPY/EXERCISES: CPT | Mod: GP,CQ | Performed by: SPECIALIST/TECHNOLOGIST

## 2025-04-10 PROCEDURE — 77080 DXA BONE DENSITY AXIAL: CPT

## 2025-04-10 PROCEDURE — 77080 DXA BONE DENSITY AXIAL: CPT | Performed by: RADIOLOGY

## 2025-04-10 ASSESSMENT — PAIN - FUNCTIONAL ASSESSMENT: PAIN_FUNCTIONAL_ASSESSMENT: 0-10

## 2025-04-10 NOTE — PROGRESS NOTES
Physical Therapy  Physical Therapy Treatment    Patient Name: Annita Mcleod  MRN: [id number]  Today's Date: 4/10/2025  Time Calculation  Start Time: 1253  Stop Time: 1339  Time Calculation (min): 46 min    Insurance:  Visit number: 2 of 12  Authorization info: Auth needed   Insurance Type: [facility] Medicare   Cert date start: 4/4/25  Cert date end: 6/27/25           Auth# [id number]    General  Reason for Referral: Lumbar radic  Referred By: RACHEL Perez MD    Current Problem  1. Lumbar radiculopathy  Follow Up In Physical Therapy          Precautions  STEADI Fall Risk Score (The score of 4 or more indicates an increased risk of falling): 9  Precautions Comment: none    Pain Assessment: 0-10    Subjective:   Patient arrived full weight bearing without a limp noting pain 8 of 10 on arrival.  Patient noted limited pool work in [place] due to colder than expected weather.   Patient notes breathing ok but still has issues. Patient notes using breathing machine a couple of times a day.     HEP Performed:  Yes    Objective:   Function: ambulating fwb.  Slightly forward posture.      Treatments:   Pool Depth: 4 foot, Temperature 92°  Forward/retro walk 5'   Side Step 4'   HR/TR 3'      5 foot work white noodle  Bicycle 5'   Alt Hams 4'  Hip ext R/L 2.5' each   Hip abd/add R/L 2.5' each (feels effort into back)   Leg circles cw/ccw 2' each   Hang 4'      4' deep  V-sit 1'   Standing HF stretch R/L 1'  Standing Hams stretch on stair R/L 1'     Charges: AQ x3     Assessment:   Patient tolerated intensity without problems.  Patient noted feeling good effort in back with noodle work.  Patient noted feeling like she did whole body exercise.      Plan:   Continue decreasing back discomfort increasing rom, flexibility, mobility, balance, strength, endurance and function as symptoms allow.  Expect 4 aquatic sessions followed by transition to land therapy.        Constantino Potter, PTA

## 2025-04-11 ENCOUNTER — APPOINTMENT (OUTPATIENT)
Dept: PHYSICAL THERAPY | Facility: CLINIC | Age: OVER 89
End: 2025-04-11
Payer: MEDICARE

## 2025-04-16 ENCOUNTER — TREATMENT (OUTPATIENT)
Dept: PHYSICAL THERAPY | Facility: CLINIC | Age: OVER 89
End: 2025-04-16
Payer: MEDICARE

## 2025-04-16 DIAGNOSIS — M54.16 LUMBAR RADICULOPATHY: Primary | ICD-10-CM

## 2025-04-16 PROCEDURE — 97113 AQUATIC THERAPY/EXERCISES: CPT | Mod: GP,CQ | Performed by: SPECIALIST/TECHNOLOGIST

## 2025-04-16 ASSESSMENT — PAIN - FUNCTIONAL ASSESSMENT: PAIN_FUNCTIONAL_ASSESSMENT: 0-10

## 2025-04-16 ASSESSMENT — PAIN SCALES - GENERAL: PAINLEVEL_OUTOF10: 0 - NO PAIN

## 2025-04-16 NOTE — PROGRESS NOTES
Physical Therapy  Physical Therapy Treatment    Patient Name: Annita Mcleod  MRN: [id number]  Today's Date: 4/16/2025  Time Calculation  Start Time: 1545  Stop Time: 1630  Time Calculation (min): 45 min    Insurance:  Visit number: 3 of 12  Authorization info: Auth needed   Insurance Type: [facility] Medicare   Cert date start: 4/4/25  Cert date end: 6/27/25           Auth# [id number]    General  Reason for Referral: Lumbar radic  Referred By: RACHEL Perez MD    Current Problem  1. Lumbar radiculopathy  Follow Up In Physical Therapy          Precautions  STEADI Fall Risk Score (The score of 4 or more indicates an increased risk of falling): 9  Precautions Comment: none    Pain Assessment: 0-10  0-10 (Numeric) Pain Score: 0 - No pain    Subjective:   Patient arrived full weight bearing without a limp no pain on arrival.  Patient sleeps with patches at night.  Patient noted initially feeling tight then relief with last aquatic session.  Patient notes a little soreness the day after last session.   HEP Performed:  Yes    Objective:   Function: ambulating fwb.  Slightly forward posture.      Treatments:   Pool Depth: 4 foot, Temperature 92°  Forward/retro walk 5'   Side Step 4'   HR/TR 3'      5 foot work white noodle  Bicycle 5'   Alt Hams 4'  Hip ext R/L 2.5' each   Hip abd/add R/L 2.5' each (feels effort into back)   Leg circles cw/ccw 2' each   Hang 4'      4' deep  V-sit 1'   Standing HF stretch R/L 1'  Standing Hams stretch on stair R/L 1'     Charges: AQ x3     Assessment:   Patient tolerated intensity without problems.  Patient had intermittent back pain when moving legs directly underneath body.  Hang eliminated symptoms.      Plan:   Continue decreasing back discomfort increasing rom, flexibility, mobility, balance, strength, endurance and function as symptoms allow.  Expect 2 aquatic sessions followed by land re-check then transition to land therapy.        Constantino Potter, PTA

## 2025-04-17 ENCOUNTER — APPOINTMENT (OUTPATIENT)
Dept: PHYSICAL THERAPY | Facility: CLINIC | Age: OVER 89
End: 2025-04-17
Payer: MEDICARE

## 2025-04-18 ENCOUNTER — TELEPHONE (OUTPATIENT)
Dept: PRIMARY CARE | Facility: CLINIC | Age: OVER 89
End: 2025-04-18
Payer: MEDICARE

## 2025-04-23 ENCOUNTER — TREATMENT (OUTPATIENT)
Dept: PHYSICAL THERAPY | Facility: CLINIC | Age: OVER 89
End: 2025-04-23
Payer: MEDICARE

## 2025-04-23 DIAGNOSIS — M54.16 LUMBAR RADICULOPATHY: Primary | ICD-10-CM

## 2025-04-23 PROCEDURE — 97113 AQUATIC THERAPY/EXERCISES: CPT | Mod: GP,CQ | Performed by: SPECIALIST/TECHNOLOGIST

## 2025-04-23 ASSESSMENT — PAIN - FUNCTIONAL ASSESSMENT: PAIN_FUNCTIONAL_ASSESSMENT: 0-10

## 2025-04-23 ASSESSMENT — PAIN SCALES - GENERAL: PAINLEVEL_OUTOF10: 0 - NO PAIN

## 2025-04-23 NOTE — PROGRESS NOTES
Physical Therapy  Physical Therapy Treatment    Patient Name: Annita Mcleod  MRN: [id number]  Today's Date: 4/23/2025  Time Calculation  Start Time: 1200  Stop Time: 1248  Time Calculation (min): 48 min    Insurance:  Visit number: 4 of 12  Authorization info: Auth needed   Insurance Type: [facility] Medicare   Cert date start: 4/4/25  Cert date end: 6/27/25           Auth# [id number]    General  Reason for Referral: Lumbar radic  Referred By: RACHEL Perez MD    Current Problem  1. Lumbar radiculopathy  Follow Up In Physical Therapy          Precautions  STEADI Fall Risk Score (The score of 4 or more indicates an increased risk of falling): 9  Precautions Comment: none    Pain Assessment: 0-10  0-10 (Numeric) Pain Score: 0 - No pain    Subjective:   Patient arrived full weight bearing without a limp no pain on arrival.  Patient sleeps with patches at night.  Patient noted initially feeling tight then relief with last aquatic session.  Patient notes a little soreness the day after last session.   HEP Performed:  Yes    Objective:   Function: ambulating fwb.  Slightly forward posture.      Treatments:   Pool Depth: 4 foot, Temperature 92°  Forward/retro walk 5'   Side Step 5'   HR/TR 3'      5 foot work white noodle  Bicycle 5'   Alt Hams 4'  Hip ext R/L 2.5' each   Hip abd/add R/L 2.5' each (feels effort into back)   Leg circles cw/ccw 2' each   Hang 4'      4' deep  V-sit 1'   Standing HF stretch R/L 1'  Standing Hams stretch on stair R/L 1'     Charges: AQ x3     Assessment:   Patient tolerated intensity without problems.  Patient had intermittent back pain when moving legs directly underneath body.  Hang eliminated symptoms.      Plan:   Continue decreasing back discomfort increasing rom, flexibility, mobility, balance, strength, endurance and function as symptoms allow.  Expect 1 aquatic sessions followed by land re-check then transition to land therapy.        Constantino Potter, PTA

## 2025-04-28 ENCOUNTER — TREATMENT (OUTPATIENT)
Dept: PHYSICAL THERAPY | Facility: CLINIC | Age: OVER 89
End: 2025-04-28
Payer: MEDICARE

## 2025-04-28 DIAGNOSIS — M54.16 LUMBAR RADICULOPATHY: Primary | ICD-10-CM

## 2025-04-28 PROCEDURE — 97113 AQUATIC THERAPY/EXERCISES: CPT | Mod: GP,CQ | Performed by: SPECIALIST/TECHNOLOGIST

## 2025-04-28 ASSESSMENT — PAIN - FUNCTIONAL ASSESSMENT: PAIN_FUNCTIONAL_ASSESSMENT: 0-10

## 2025-04-28 ASSESSMENT — PAIN SCALES - GENERAL: PAINLEVEL_OUTOF10: 0 - NO PAIN

## 2025-04-29 ENCOUNTER — APPOINTMENT (OUTPATIENT)
Dept: PHYSICAL THERAPY | Facility: CLINIC | Age: OVER 89
End: 2025-04-29
Payer: MEDICARE

## 2025-04-29 DIAGNOSIS — M54.16 LUMBAR RADICULOPATHY: Primary | ICD-10-CM

## 2025-05-01 NOTE — PROGRESS NOTES
Physical Therapy  Physical Therapy Treatment    Patient Name: Annita Mcleod  MRN: [id number]  Today's Date: 5/2/2025  Time Calculation  Start Time: 0930  Stop Time: 1008  Time Calculation (min): 38 min    Insurance:  Visit number: 5 of 12  Authorization info: Auth needed   Insurance Type: [facility] Medicare   Cert date start: 4/4/25  Cert date end: 6/27/25           Auth# [id number]    General       Current Problem  1. Lumbar radiculopathy  Follow Up In Physical Therapy          Precautions  Precautions Comment: none    Pain Assessment: 0-10  0-10 (Numeric) Pain Score: 8    Subjective:   Patient arrived with increased pain today. Feels better when she is in the water.         HEP Performed:  Yes    Objective:     Slightly forward posture.      Treatments:   R. Stepper, L2.5, 3'  Supine LTR 2'  Clamshells x 10- increased pain  STM to lumbar muscles   MHP 10'    Assessment:   Patient with decreased pain after STM and heat to 6/10. Will use heat at home. Provided [facility] pass to use pool independently and pictures of pool exercises.      Plan:   Switch off between pool and land to tolerance.      Carrie Choudhury, PT

## 2025-05-02 ENCOUNTER — TREATMENT (OUTPATIENT)
Dept: PHYSICAL THERAPY | Facility: CLINIC | Age: OVER 89
End: 2025-05-02
Payer: MEDICARE

## 2025-05-02 DIAGNOSIS — M54.16 LUMBAR RADICULOPATHY: Primary | ICD-10-CM

## 2025-05-02 PROCEDURE — 97140 MANUAL THERAPY 1/> REGIONS: CPT | Mod: GP

## 2025-05-02 PROCEDURE — 97110 THERAPEUTIC EXERCISES: CPT | Mod: GP

## 2025-05-02 ASSESSMENT — PAIN - FUNCTIONAL ASSESSMENT: PAIN_FUNCTIONAL_ASSESSMENT: 0-10

## 2025-05-02 ASSESSMENT — PAIN SCALES - GENERAL: PAINLEVEL_OUTOF10: 8

## 2025-05-05 ENCOUNTER — APPOINTMENT (OUTPATIENT)
Dept: PHYSICAL THERAPY | Facility: CLINIC | Age: OVER 89
End: 2025-05-05
Payer: MEDICARE

## 2025-05-05 DIAGNOSIS — M54.16 LUMBAR RADICULOPATHY: Primary | ICD-10-CM

## 2025-05-09 ENCOUNTER — TREATMENT (OUTPATIENT)
Dept: PHYSICAL THERAPY | Facility: CLINIC | Age: OVER 89
End: 2025-05-09
Payer: MEDICARE

## 2025-05-09 DIAGNOSIS — M54.16 LUMBAR RADICULOPATHY: Primary | ICD-10-CM

## 2025-05-09 PROCEDURE — 97113 AQUATIC THERAPY/EXERCISES: CPT | Mod: GP,CQ | Performed by: SPECIALIST/TECHNOLOGIST

## 2025-05-09 ASSESSMENT — PAIN SCALES - GENERAL: PAINLEVEL_OUTOF10: 0 - NO PAIN

## 2025-05-09 ASSESSMENT — PAIN - FUNCTIONAL ASSESSMENT: PAIN_FUNCTIONAL_ASSESSMENT: 0-10

## 2025-05-09 NOTE — PROGRESS NOTES
Physical Therapy  Physical Therapy Treatment    Patient Name: Annita Mcleod  MRN: [id number]  Today's Date: 5/9/2025  Time Calculation  Start Time: 0745  Stop Time: 0830  Time Calculation (min): 45 min    Insurance:  Visit number: 6 of 12  Authorization info: Auth needed   Insurance Type: [facility] Medicare   Cert date start: 4/4/25  Cert date end: 6/27/25           Auth# [id number]    General  Reason for Referral: Lumbar radic  Referred By: RACHEL Perez MD    Current Problem  1. Lumbar radiculopathy  Follow Up In Physical Therapy          Precautions  STEADI Fall Risk Score (The score of 4 or more indicates an increased risk of falling): 9  Precautions Comment: none    Pain Assessment: 0-10  0-10 (Numeric) Pain Score: 0 - No pain    Subjective:   Patient noted intermittent pain yesterday and no pain this AM.  Patient arrived full weight bearing.   HEP Performed:  Yes    Objective:   Function: ambulating fwb.  Slightly forward posture.      Treatments:   Pool Depth: 4 foot, Temperature 92°  Forward/retro walk 5'   Side Step 5'   HR/TR 3'      5 foot work white noodle  Bicycle 5'   Alt Hams 4'  Hip ext R/L 2.5' each   Hip abd/add R/L 2.5' each (feels effort into back)   Leg circles cw/ccw 2' each   Hang 5'      4' deep  V-sit 1'   Standing HF stretch R/L 1'  Standing Hams stretch on stair R/L 1'     Charges: AQ x3     Assessment:   Patient noted effort with circles and ease of hamstring curls.     Plan:   Continue decreasing back discomfort increasing ROM, flexibility, mobility, balance, strength, endurance and function as symptoms allow.   Patient alternating land and aquatic sessions.  Patient leaves on Sunday for trip to .  Patient scheduled additional sessions after trip to  including follow-up with Carrie Choudhury PT in late June.    Constantino Potter PTA

## 2025-05-14 ENCOUNTER — APPOINTMENT (OUTPATIENT)
Dept: PHYSICAL THERAPY | Facility: CLINIC | Age: OVER 89
End: 2025-05-14
Payer: MEDICARE

## 2025-05-14 DIAGNOSIS — M54.16 LUMBAR RADICULOPATHY: Primary | ICD-10-CM

## 2025-05-21 ENCOUNTER — APPOINTMENT (OUTPATIENT)
Dept: PHYSICAL THERAPY | Facility: CLINIC | Age: OVER 89
End: 2025-05-21
Payer: MEDICARE

## 2025-05-21 DIAGNOSIS — M54.16 LUMBAR RADICULOPATHY: Primary | ICD-10-CM

## 2025-05-23 ENCOUNTER — TREATMENT (OUTPATIENT)
Dept: PHYSICAL THERAPY | Facility: CLINIC | Age: OVER 89
End: 2025-05-23
Payer: MEDICARE

## 2025-05-23 DIAGNOSIS — M54.16 LUMBAR RADICULOPATHY: Primary | ICD-10-CM

## 2025-05-23 PROCEDURE — 97113 AQUATIC THERAPY/EXERCISES: CPT | Mod: GP,CQ | Performed by: SPECIALIST/TECHNOLOGIST

## 2025-05-23 ASSESSMENT — PAIN - FUNCTIONAL ASSESSMENT: PAIN_FUNCTIONAL_ASSESSMENT: 0-10

## 2025-05-23 NOTE — PROGRESS NOTES
Physical Therapy  Physical Therapy Treatment    Patient Name: Annita Mcleod  MRN: [id number]  Today's Date: 5/23/2025       Insurance:  Visit number: 7 of 12  Authorization info: Auth needed   Insurance Type: [facility] Medicare   Cert date start: 4/4/25  Cert date end: 6/27/25           Auth# [id number]    General  Reason for Referral: Lumbar radic  Referred By: RACHEL Perez MD    Current Problem  1. Lumbar radiculopathy  Follow Up In Physical Therapy          Precautions  STEADI Fall Risk Score (The score of 4 or more indicates an increased risk of falling): 9  Precautions Comment: none    Pain Assessment: 0-10    Subjective:   Patient had good trip to  with warm weather.  Patient walked with great grand kids. Patient noted minimal back pain while in .   Patient noted significant back pain yesterday attributed to the travel home. Patient notes pain still present today but much more manageable.  Patient noted pain decreased with pain relieving lotion.     HEP Performed:  Yes    Objective:   Function: ambulating fwb.  Slightly forward posture.      Treatments:   Pool Depth: 4 foot, Temperature 92°  Forward/retro walk 5'   Side Step 5'   HR/TR 3'      5 foot work white [employer]  Bicycle 5'   Alt Hams 4'  Hip ext R/L 2.5' each   Hip abd/add R/L 2.5' each (feels effort into back)   Leg circles cw/ccw 2' each   Hang 5'      4' deep  V-sit 1'   Standing HF stretch R/L 1'  Standing Hams stretch on stair R/L 1'     Charges: AQ x3     Assessment:   Patient tolerated intensity noting intermittent back soreness during aquatic exercise.  Patient noted feeling good post treatment.      Plan:   Continue decreasing back discomfort increasing ROM, flexibility, mobility, balance, strength, endurance and function as symptoms allow.   Patient alternating land and aquatic sessions.  For 4 sessions then concludes with land session followed by re-check with Carrie Choudhury PT on June 24, 2025.     Constantino Potter PTA

## 2025-05-28 ENCOUNTER — TREATMENT (OUTPATIENT)
Dept: PHYSICAL THERAPY | Facility: CLINIC | Age: OVER 89
End: 2025-05-28
Payer: MEDICARE

## 2025-05-28 DIAGNOSIS — M54.16 LUMBAR RADICULOPATHY: Primary | ICD-10-CM

## 2025-05-28 PROCEDURE — 97112 NEUROMUSCULAR REEDUCATION: CPT | Mod: GP,CQ | Performed by: SPECIALIST/TECHNOLOGIST

## 2025-05-28 PROCEDURE — 97110 THERAPEUTIC EXERCISES: CPT | Mod: GP,CQ | Performed by: SPECIALIST/TECHNOLOGIST

## 2025-05-28 ASSESSMENT — PAIN - FUNCTIONAL ASSESSMENT: PAIN_FUNCTIONAL_ASSESSMENT: 0-10

## 2025-05-28 ASSESSMENT — PAIN SCALES - GENERAL: PAINLEVEL_OUTOF10: 8

## 2025-05-28 NOTE — PROGRESS NOTES
Physical Therapy  Physical Therapy Treatment    Patient Name: Annita Mcleod  MRN: [id number]  Today's Date: 5/28/2025  Time Calculation  Start Time: 1155  Stop Time: 1241  Time Calculation (min): 46 min    Insurance:  Visit number: 8 of 12  Authorization info: Auth needed   Insurance Type: [facility] Medicare   Cert date start: 4/4/25  Cert date end: 6/27/25           Auth# [id number]    General  Reason for Referral: Lumbar radic  Referred By: RACHEL Perez MD    Current Problem  1. Lumbar radiculopathy  Follow Up In Physical Therapy          Precautions  STEADI Fall Risk Score (The score of 4 or more indicates an increased risk of falling): 9  Precautions Comment: none    Pain Assessment: 0-10  0-10 (Numeric) Pain Score: 8    Subjective:   Patient notes being stressed due to her car not starting today.  Patient notes back pain 8 of 10 on arrival.  HEP Performed:  Yes    Objective:   Function: ambulating fwb.  Slightly forward posture.      Treatments:   Stepper L2 5 min   4 kg KB ISO SB R/L 1'   Bravo iso rot R/L 5# 1'   Seated lumbar ext 25# band 20x   Hip ext R/L 44# 20x  Hams 20# 20x  SS hams R/L 1'   PB LTR 20x, hams 20x, bridge 20x  SS R/L Pirif, HF 1'   Standing Quad stretch R/L 1', incline stretch 1'     Charges: TE x2, NME      Assessment:   Patient tolerated intensity noting good effort with lumbar strengthening and core stability.  Patient also noted good effort with hamstring curls.     Plan:   Continue decreasing back discomfort increasing ROM, flexibility, mobility, balance, strength, endurance and function as symptoms allow.   Patient alternating land and aquatic sessions.  For 4 sessions then concludes with land session followed by re-check with Carrie Choudhury PT on June 24, 2025.     Constantino Potter, PTA

## 2025-06-04 ENCOUNTER — TREATMENT (OUTPATIENT)
Dept: PHYSICAL THERAPY | Facility: CLINIC | Age: OVER 89
End: 2025-06-04
Payer: MEDICARE

## 2025-06-04 DIAGNOSIS — M54.16 LUMBAR RADICULOPATHY: Primary | ICD-10-CM

## 2025-06-04 PROCEDURE — 97113 AQUATIC THERAPY/EXERCISES: CPT | Mod: GP,CQ | Performed by: SPECIALIST/TECHNOLOGIST

## 2025-06-04 ASSESSMENT — PAIN - FUNCTIONAL ASSESSMENT: PAIN_FUNCTIONAL_ASSESSMENT: 0-10

## 2025-06-04 NOTE — PROGRESS NOTES
Physical Therapy  Physical Therapy Treatment    Patient Name: Annita Mcleod  MRN: [id number]  Today's Date: 6/4/2025  Time Calculation  Start Time: 1200  Stop Time: 1246  Time Calculation (min): 46 min    Insurance:  Visit number: 9 of 12  Authorization info: Auth needed   Insurance Type: [facility] Medicare   Cert date start: 4/4/25  Cert date end: 6/27/25           Auth# [id number]    General  Reason for Referral: Lumbar radic  Referred By: RACHEL Perez MD    Current Problem  1. Lumbar radiculopathy  Follow Up In Physical Therapy          Precautions  STEADI Fall Risk Score (The score of 4 or more indicates an increased risk of falling): 9  Precautions Comment: none    Pain Assessment: 0-10  0-10 (Numeric) Pain Score:  (varies with activity)    Subjective:   Patient arrived full weight bearing without a limp noting recently having a bout of vertigo.  Patient noted feeling muscle effort after last session.  Patient has been doing ball work at home.  Patient notes seated lumbar extension at home has been helpful as well.        HEP Performed:  Yes    Objective:   Function: ambulating fwb.  Slightly forward posture.      Treatments:   Pool Depth: 4 foot, Temperature 92°  Forward/retro walk 5'   Side Step 5'   HR/TR 3'      5 foot work white noodle  Bicycle 5'   Alt Hams 4'  Hip ext R/L 2.5' each   Hip abd/add R/L 2.5' each (feels effort into back)   Leg circles cw/ccw 2' each   Hang 5'      4' deep  V-sit 1'   Standing HF stretch R/L 1'  Standing Hams stretch on stair R/L 1'     Charges: AQ x3     Assessment:   Patient tolerated intensity noting feeling good through active aquatic exercise.  Patient notes mild back tightness during HF stretching.  Patient noted feeling good post treatment.      Plan:   Continue decreasing back discomfort increasing ROM, flexibility, mobility, balance, strength, endurance and function as symptoms allow.   Patient alternating land and aquatic sessions.  Patient has 2 sessions then concludes with  land session followed by re-check with Carrie Choudhury PT on June 24, 2025.     Constantino Potter PTA

## 2025-06-10 ENCOUNTER — TELEPHONE (OUTPATIENT)
Dept: PRIMARY CARE | Facility: CLINIC | Age: OVER 89
End: 2025-06-10
Payer: MEDICARE

## 2025-06-11 ENCOUNTER — APPOINTMENT (OUTPATIENT)
Dept: PHYSICAL THERAPY | Facility: CLINIC | Age: OVER 89
End: 2025-06-11
Payer: MEDICARE

## 2025-06-11 DIAGNOSIS — M54.16 LUMBAR RADICULOPATHY: Primary | ICD-10-CM

## 2025-06-12 ENCOUNTER — OFFICE VISIT (OUTPATIENT)
Dept: PRIMARY CARE | Facility: CLINIC | Age: OVER 89
End: 2025-06-12
Payer: MEDICARE

## 2025-06-12 VITALS
WEIGHT: 126.6 LBS | DIASTOLIC BLOOD PRESSURE: 64 MMHG | SYSTOLIC BLOOD PRESSURE: 126 MMHG | OXYGEN SATURATION: 98 % | HEIGHT: 62 IN | BODY MASS INDEX: 23.3 KG/M2 | HEART RATE: 70 BPM

## 2025-06-12 DIAGNOSIS — H81.10 BENIGN PAROXYSMAL POSITIONAL VERTIGO, UNSPECIFIED LATERALITY: Primary | ICD-10-CM

## 2025-06-12 PROCEDURE — 3074F SYST BP LT 130 MM HG: CPT | Performed by: STUDENT IN AN ORGANIZED HEALTH CARE EDUCATION/TRAINING PROGRAM

## 2025-06-12 PROCEDURE — 3078F DIAST BP <80 MM HG: CPT | Performed by: STUDENT IN AN ORGANIZED HEALTH CARE EDUCATION/TRAINING PROGRAM

## 2025-06-12 PROCEDURE — 1160F RVW MEDS BY RX/DR IN RCRD: CPT | Performed by: STUDENT IN AN ORGANIZED HEALTH CARE EDUCATION/TRAINING PROGRAM

## 2025-06-12 PROCEDURE — 1036F TOBACCO NON-USER: CPT | Performed by: STUDENT IN AN ORGANIZED HEALTH CARE EDUCATION/TRAINING PROGRAM

## 2025-06-12 PROCEDURE — 99213 OFFICE O/P EST LOW 20 MIN: CPT | Performed by: STUDENT IN AN ORGANIZED HEALTH CARE EDUCATION/TRAINING PROGRAM

## 2025-06-12 PROCEDURE — 1159F MED LIST DOCD IN RCRD: CPT | Performed by: STUDENT IN AN ORGANIZED HEALTH CARE EDUCATION/TRAINING PROGRAM

## 2025-06-12 RX ORDER — MECLIZINE HCL 12.5 MG 12.5 MG/1
12.5 TABLET ORAL 3 TIMES DAILY PRN
Qty: 30 TABLET | Refills: 2 | Status: SHIPPED | OUTPATIENT
Start: 2025-06-12 | End: 2026-06-12

## 2025-06-12 NOTE — PROGRESS NOTES
"Subjective   Patient ID: Annita Mcleod is a 89 y.o. female who presents for Follow-up (Discuss vertigo.).        HPI    89 y/o female with CAD s/p 2005 with MI in 2021 s/p PCI , mild - moderate depression , hypothyroidism and paresthesia of LE        Noticed lightheadedness when waking up in the morning . First episode was few months ago .   Sx are random .   Most recent episodes was few days ago when she woke up to use the rest room and could not sit but slid down to the floor    Blurred vision during this episode, room was spinning , nausea +   No heart racing .         Visit Vitals  /64   Pulse 70   Ht 1.575 m (5' 2\")   Wt 57.4 kg (126 lb 9.6 oz)   SpO2 98%   BMI 23.16 kg/m²   Smoking Status Never   BSA 1.58 m²      No LMP recorded.   Current Outpatient Medications   Medication Instructions    albuterol 90 mcg/actuation inhaler 2 puffs, inhalation, Every 6 hours PRN    albuterol 2.5 mg, nebulization, Every 6 hours PRN    aspirin 81 mg, Daily    atorvastatin (Lipitor) 40 mg tablet 1 tablet, Nightly    cholecalciferol (VITAMIN D-3) 25 mcg, Daily    cholestyramine (Questran) 4 gram packet 1 packet, 2 times daily    clopidogrel (PLAVIX) 75 mg, oral, Daily    fluticasone (Flovent) 110 mcg/actuation inhaler 2 puffs, 2 times daily RT    levothyroxine (SYNTHROID, LEVOXYL) 50 mcg, oral, Daily    losartan (COZAAR) 50 mg, oral, Daily    meclizine (ANTIVERT) 12.5 mg, oral, 3 times daily PRN    metoprolol tartrate (LOPRESSOR) 6.25 mg, 2 times daily    nitroglycerin (NITROSTAT) 0.4 mg, Every 5 min PRN    ranolazine (RANEXA) 500 mg, 2 times daily    sertraline (ZOLOFT) 25 mg, oral, Daily    spironolactone (ALDACTONE) 25 mg, Daily      Social History     Tobacco Use    Smoking status: Never    Smokeless tobacco: Never   Substance Use Topics    Alcohol use: Not Currently        Review of Systems    Constitutional : No feeling poorly / fevers/ chills / night sweats/ fatigue      As noted in HPI   CNS: No confusion / HA/ " tingling/ numbness/ weakness of extremities  Psychiatric: No anxiety/ depression/ SI/HI    All other systems have been reviewed and are negative for complaint       Physical Exam    Constitutional : Vitals reviewed. Alert and in no distress  Cardiovascular : RRR, Normal S1, S2, no peripheral edema   Pulmonary: No respiratory distress, CTAB   MSK : Normal gait and station , strength and tone   Skin: Warm to touch ,  normal skin turgor   Neurologic : CNs 2-12 grossly intact , no obvious FNDs  Psych : A,Ox3, normal mood and affect      Assessment/Plan   Diagnoses and all orders for this visit:  Benign paroxysmal positional vertigo, unspecified laterality  -     Referral to Physical Therapy; Future  -     meclizine (Antivert) 12.5 mg tablet; Take 1 tablet (12.5 mg) by mouth 3 times a day as needed for dizziness.  -     Walker with Seat      BPPV based on ROS   Mgmt as above    Use walker at night   Sit before getting up to walk                 Conditions addressed and mgmt as noted above.  Pertinent labs, images/ imaging reports , chart review was done .   Age appropriate labs / labs for mgmt of chronic medical conditions ordered, further mgmt pending the results.         RTO in  m or sooner if needed . Labs to be done few days prior to the next visit.    For the sake of billing , entire note needs to be taken into consideration. Repetition of meds is avoided for the sake of redundancy. Med list above reflects reconciled meds. Management of abnormal results may not always result in an addendum to the note , an annotation at the end of result or communication via pt portal is to be considered .      This note is intended for the physician writing it, as well as to communicate findings to other healthcare professionals. These notes use medical lexicon that may be misunderstood by non medical persons. Therefore, interpretations of medical notes and terminology should be approached with caution.

## 2025-06-13 ENCOUNTER — TREATMENT (OUTPATIENT)
Dept: PHYSICAL THERAPY | Facility: CLINIC | Age: OVER 89
End: 2025-06-13
Payer: MEDICARE

## 2025-06-13 DIAGNOSIS — M54.16 LUMBAR RADICULOPATHY: Primary | ICD-10-CM

## 2025-06-13 PROCEDURE — 97112 NEUROMUSCULAR REEDUCATION: CPT | Mod: GP,CQ | Performed by: SPECIALIST/TECHNOLOGIST

## 2025-06-13 PROCEDURE — 97110 THERAPEUTIC EXERCISES: CPT | Mod: GP,CQ | Performed by: SPECIALIST/TECHNOLOGIST

## 2025-06-13 ASSESSMENT — PAIN - FUNCTIONAL ASSESSMENT: PAIN_FUNCTIONAL_ASSESSMENT: 0-10

## 2025-06-13 NOTE — PROGRESS NOTES
Physical Therapy  Physical Therapy Treatment    Patient Name: Annita Mcleod  MRN: [id number]  Today's Date: 6/13/2025  Time Calculation  Start Time: 1120  Stop Time: 1203  Time Calculation (min): 43 min    Insurance:  Visit number: 10 of 12  Authorization info: Auth needed   Insurance Type: [facility] Medicare   Cert date start: 4/4/25  Cert date end: 6/27/25           Auth# [id number]    General  Reason for Referral: Lumbar radic  Referred By: RACHEL Perez MD    Current Problem  1. Lumbar radiculopathy            Precautions  STEADI Fall Risk Score (The score of 4 or more indicates an increased risk of falling): 9  Precautions Comment: none    Pain Assessment: 0-10    Subjective:   Patient notes having episodic BPPV which MD ordered medication for.  Patient notes recent vertigo although none on arrival today.    Patient notes back pain 8 of 10 on arrival.  HEP Performed:  Yes    Objective:   Function: ambulating fwb.  Slightly forward posture.      Treatments:   Stepper L2 5 min   4 kg KB ISO SB R/L 1'   Bravo iso rot R/L 5# 1'   Seated lumbar ext 25# band 20x   Hip ext R/L 44# 20x  Hams 20# 20x  SS hams R/L 1'   PB LTR 20x, hams 20x, bridge 20x  SS R/L Pirif, HF 1'   Standing Quad stretch R/L 1', incline stretch 1'     Charges: TE x2, NME      Assessment:   Patient tolerated intensity noting feeling a little better post treatment.  Patient still gets winded easily.      Plan:   Continue decreasing back discomfort increasing ROM, flexibility, mobility, balance, strength, endurance and function as symptoms allow.   Patient alternating land and aquatic sessions.  Patient has re-check with Carrie Choudhury, PT on June 24, 2025.     Constantino Potter PTA

## 2025-06-19 ENCOUNTER — CLINICAL SUPPORT (OUTPATIENT)
Dept: PHYSICAL THERAPY | Facility: CLINIC | Age: OVER 89
End: 2025-06-19
Payer: MEDICARE

## 2025-06-19 VITALS — HEART RATE: 68 BPM | SYSTOLIC BLOOD PRESSURE: 104 MMHG | DIASTOLIC BLOOD PRESSURE: 55 MMHG

## 2025-06-19 DIAGNOSIS — H81.12 BPPV (BENIGN PAROXYSMAL POSITIONAL VERTIGO), LEFT: ICD-10-CM

## 2025-06-19 DIAGNOSIS — R26.89 IMBALANCE: ICD-10-CM

## 2025-06-19 DIAGNOSIS — R42 DIZZINESS: Primary | ICD-10-CM

## 2025-06-19 DIAGNOSIS — R26.81 UNSTEADINESS ON FEET: ICD-10-CM

## 2025-06-19 DIAGNOSIS — H81.10 BENIGN PAROXYSMAL POSITIONAL VERTIGO, UNSPECIFIED LATERALITY: ICD-10-CM

## 2025-06-19 DIAGNOSIS — Z91.81 AT MODERATE RISK FOR FALL: ICD-10-CM

## 2025-06-19 PROCEDURE — 97161 PT EVAL LOW COMPLEX 20 MIN: CPT | Mod: GP | Performed by: PHYSICAL THERAPIST

## 2025-06-19 PROCEDURE — 97112 NEUROMUSCULAR REEDUCATION: CPT | Mod: GP | Performed by: PHYSICAL THERAPIST

## 2025-06-19 ASSESSMENT — ENCOUNTER SYMPTOMS
OCCASIONAL FEELINGS OF UNSTEADINESS: 1
DEPRESSION: 0
LOSS OF SENSATION IN FEET: 0

## 2025-06-19 ASSESSMENT — PAIN SCALES - WONG BAKER: WONGBAKER_NUMERICALRESPONSE: HURTS LITTLE BIT

## 2025-06-19 ASSESSMENT — PAIN - FUNCTIONAL ASSESSMENT: PAIN_FUNCTIONAL_ASSESSMENT: WONG-BAKER FACES

## 2025-06-19 NOTE — PROGRESS NOTES
Physical Therapy    Physical Therapy Evaluation and Treatment      Patient Name: Annita Mcleod  MRN: [id number]  Today's Date: 6/19/2025  [facility]-auth required  Primary dx=vertigo  Time Entry:   Time Calculation  Start Time: 1305  Stop Time: 1400  Time Calculation (min): 55 min  PT Evaluation Time Entry  PT Evaluation (Low) Time Entry: 30  PT Therapeutic Procedures Time Entry  Neuromuscular Re-Education Time Entry: 25       Assessment:  Patient is a 89 year old female referred to [facility]'s outpatient physical therapy services with a chief complaint of vertigo.  The patient demonstrated subjective vertigo on examination in left charito hallpike position. In addition pt. With apogeotropic nystagmus with roll test.  Ms. Mcleod was treated with one round of epley maneuver for left posterior canal BPPV, although there was no nystagmus in testing position pt. Reported dizziness that was not alleviated with cervical distraction.  Pt. Was on meclizine, which may have suppressed nystagmus during frenzle lens testing today.  Ms. Mcleod may also have a cervicogenic component to his symptoms due to her dizziness being alleviated with cervical distraction.  Ms. Mcleod also has a history of bilateral neurosensory hearing loss and peripheral neuropathy and will benefit from further balance testing and treatment in the future.  Ms. Mcleod will benefit from physical therapy service to reduce dizziness, improve balance and decrease fall risk.    PT Assessment  PT Assessment Results: Decreased endurance, Decreased range of motion, Impaired balance, Impaired hearing, Impaired vision, Impaired sensation, Pain  Rehab Prognosis: Good     Plan: canalith repositioning, balance testing, cervical ROM  OP PT Plan  Treatment/Interventions: Canalith repositioning, Cryotherapy, Dry needling, Education/ Instruction, Gait training, Hot pack, Neuromuscular re-education, Manual therapy, Self care/ home management, Taping techniques, Therapeutic  "activities, Therapeutic exercises  PT Plan: Skilled PT  PT Frequency:  (1-2x/week)  Duration: 10 visits  Onset Date: 06/12/25  Certification Period Start Date: 06/19/25  Certification Period End Date: 09/17/25  Rehab Potential: Good    Current Problem:   1. Dizziness  Follow Up In Physical Therapy      2. Benign paroxysmal positional vertigo, unspecified laterality  Referral to Physical Therapy    Follow Up In Physical Therapy      3. At moderate risk for fall        4. Unsteadiness on feet        5. BPPV (benign paroxysmal positional vertigo), left        6. Imbalance            Subjective    Patient Report:  Pt. Had one fall a couple of weeks ago. She was sitting and she fell and hit her shoulder due to the room spinning.    The doctor told her to use  a walker and they got her walker out of the basement.   Spends [place] in [place], rides tricycle for 30 minutes.  The patient's birthday is wrong in her chart.  She gets pain in her back, a little bit right now.  She gets physical therapy by [facility].  She had Covid in November of 2021, she got neuropathy about that time.  She got covid a 2nd time last year.  Pt. Dtr. \"Alycia\" present for full visit.    Home Setup:  Pt. Lives alone. She has a room in her dtrs. Cond but likes to be in her ranch on one floor. Dtr. Lives a couple of miles a way. 2 [address] home with no railing. There is 3 [address] from backyard with railing.  Has a walk in shower  Equipment: has a cane and walker at home.    Hobbies:watches TV, uses the computer, reading, puzzles  Physical Activity: she tries to walk 1/2 mile to a mile every day.   Occupation: retired  Sleep: sleeps okay, takes melatonin, doctor just prescribed new medication meclizine at night since her dizziness started  Hydration: tries to drink a lot, doesn't count the cups, drinks small bottles   Falls:  1 fall a couple weeks ago  Pt. Goal: \"no dizzy spells\"     HPI:     General  Reason for Referral: BPPV  Referred By: Dr. Desiree Salinas " MD Ana  Preferred Learning Style: written, verbal  Precautions:  Precautions  STEADI Fall Risk Score (The score of 4 or more indicates an increased risk of falling): 12  Vital Signs:  Vital Signs  Heart Rate: 68  BP: 104/55  BP Location: Left arm  BP Method: Automatic  Pain:  Pain Assessment  Pain Assessment: Elliott-Baker FACES  Elliott-Baker FACES Pain Rating: Hurts little bit  Pain Type: Chronic pain  Pain Location: Back         Objective   Vestibular assessment:  Falls=1 fall a couple of we  sensory deficits=numbness/tingling in her feet, she has neuropathy  visual deficits=bifocals  hearing loss= bilateral neurosensory hearing loss, wears hearing aide in right only, left side isnt working well.  Roll Test R=apogeotropic nystagmus  Roll test L=neg.   Supine to sitting=nausea and dizziness  [place] Hallpike test R= neg. For nystagmus with mild report of dizzy which was alleviated with cervical distraction  Bert hallpike test L=no nystagmus however subjective dizzy   Gait=wide AMANDEEP, mild veering of path with no device requiring supervision assist    Lower Extremity Strength MMT (tested in seated)    Left Right   Hip flexion 4-/5 4-/5   Hip ADD  4/5 4/5   Hip ABD 4/5 4/5   Knee flexion 4/5 4/5   Knee Extension 4-/5 4-/5   Ankle Dorsiflexion 4/5 4/5   Ankle Plantarflexion 4/5 4/5   Ankle Inversion 4/5 4/5   Ankle Eversion 4/5 4/5           Outcome Measures:  Other Measures  30 Second Sit to Stand: 11 reps with unilaterlal UE support  Dizziness Handicap Inventory: 72       *due to time constraints further functional outcome measures and cervical ROM assessment will be performed in future visits.    Treatments:    Neuro Re-Ed:  Extensive vestibular specific education provided on anatomy and physiology of vestibular system and definition of BPPV.  Provided education with verbal explanation and physical model of inner ear.  Educated pt. on signs and symptoms of BPPV and how it is treated.  Discussed potential for  "cervicogenic component to pt. Symptoms and plan to work on BPPV, cervical spine and balance.  Reviewed and provided printed BPPV handout with patient.   -educated pt. On utilizing device for balance, no walking outside until vertigo resolves and need for further evaluation on which device helps pt. Balance  -educated pt./dtr. on prolonged lying for left side to be started tomorrow  -educated pt/dtr. On canalith repositioning precautions for 24 hours  -educated pt./dtr on home safety including grab bars in shower and at entry of home    EDUCATION:  -see treatment    Outpatient Education  Individual(s) Educated: Patient, Child (dtr. \"Alycia\")  Education Provided: Fall Risk, Home Exercise Program, Home Safety, Anatomy, POC, Physiology  Risk and Benefits Discussed with Patient/Caregiver/Other: yes  Patient/Caregiver Demonstrated Understanding: yes  Plan of Care Discussed and Agreed Upon: yes  Patient Response to Education: Patient/Caregiver Verbalized Understanding of Information    Goals:  Active       PT Problem       PT Goal 1 STG: Pt. Will improve DHI by 9 points to meet MCID for significant change and indicate improved quality of life by end of POC.        Start:  06/19/25    Expected End:  09/17/25            PT Goal 2 STG: Pt. Will be independent in HEP in next 1-2 visits to promote self efficacy, manage symptoms and meet other LTG.        Start:  06/19/25    Expected End:  09/17/25            PT Goal 3 STG: pt. will test negative for dizziness on canalith positional testing by visit 5       Start:  06/19/25    Expected End:  09/17/25            PT Goal 4 STG: Pt. will complete MCTSIB in next 1-2 visits       Start:  06/19/25    Expected End:  09/17/25            PT Goal 5 LTG: Pt. Will improve DHI by 18 points to meet MCID for significant change and indicate improved quality of life by end of POC.        Start:  06/19/25    Expected End:  09/17/25            Patient Stated Goal 1: Pt. Goal: \"no dizzy spells\"   "      Start:  25    Expected End:  25            PT Goal 6: Pt. Will improve EC on foam condition of MCTSIB to indicate improved utilization of vestibular cues for balance by end of POC.        Start:  25    Expected End:  25                  Insurance Authorization Information  Date of Evaluation: 25    Onset Date: 2025    Referring Physician: Desiree Perez     Surgery in the Last 3 months:  no    CPT Codes: Therapeutic Exercise: 30854, Therapeutic Activity: 04739, Neuromuscular Re-Education: 27336, Manual Therapy: 94638, Gait Trainin, Self-Care/Home Management Trainin, and Canalith Repositionin    Diagnosis:   Problem List Items Addressed This Visit           ICD-10-CM       Advance Directives and General Issues    At moderate risk for fall Z91.81       ENT    Vertigo - Primary R42    BPPV (benign paroxysmal positional vertigo), left H81.12       Symptoms and Signs    Unsteadiness on feet R26.81    Imbalance R26.89     Other Visit Diagnoses         Codes      Benign paroxysmal positional vertigo, unspecified laterality     H81.10    Relevant Orders    Follow Up In Physical Therapy               Functional Outcome:  DHI    OT / PT Evaluation complexity:  moderate    Which of the following best describes the primary reason of therapy: Improving, restoring, or adapting functional mobility or skills    Visits Requested: 14    Date Range: 90 days    Select all conditions that apply: None of these apply       Dayna Anderson, PT

## 2025-06-23 NOTE — PROGRESS NOTES
Physical Therapy  Physical Therapy Orthopedic Progress Note and Discharge Note    Patient Name: Annita Mcleod  MRN: [id number]  Today's Date: 6/24/2025  Time Calculation  Start Time: 1145  Stop Time: 1210  Time Calculation (min): 25 min    Insurance:  Visit number: 11 of 12  Authorization info: Auth needed   Insurance Type: [facility]   Cert date start: 4/4/25  Cert date end: 6/27/25           Auth# [id number]     General  Reason for Referral: Lumbar radic  Referred By: RACHEL Perez MD    Current Problem  1. Lumbar radiculopathy            Precautions  Precautions Comment: none      Subjective:    Patient and daughter report that land exercise is making pain worse. Only feels better in the water. Has a pool that she can use during the summer. Has not looked into silver sneakers access.     Current Condition:   Same    Pain:  Pain Assessment: 0-10  0-10 (Numeric) Pain Score: 6    Self Reported Function (0-100%) = 60%  (100% being back to PLOF)    Objective:    Gait: decreased lavell                         Balance: SLS-     R: 0 sec.           L: 0 sec.     Palpation: TTP across low back and left glute     Flexibility: mod tight B HS, quad, HF, gastroc     ROM     Lumbar AROM (%)     Flexion: 50% P  Extension: 10% P  (L) Side Bend: 10% P  (R) Side Bend: 10% P  (L) Rotation: 75% P  (R) Rotation: 50% P        Hip AROM (Degrees): R=L                                   Strength Testing (pain with all testing)     LE strength MMT          R         L  Hip flexion                   4/5       4/5  Hip extension              2+/5     2+/5  Hip abduction              3+/5     3+/5  Hip adduction              3-/5      3-/5  Hip IR                           3+/5     3+/5  Hip ER                          3+/5     3+/5  Knee extension             4-/5     4-/5  Knee flexion                 4/5       4-/5                                      Special Tests     Slump Test: positive B     Radicular Symptoms: B thighs      Outcome  Measures: Updated 6/24/2025  Other Measures  Oswestry Disablity Index (BLANCHE): 32     EDUCATION: home exercise program, plan of care, activity modifications, pain management, and injury pathology       Goals: Updated 6/24/2025  Resolved       PT Problem       PT Goals (Adequate for Discharge)       Start:  04/04/25    Expected End:  07/03/25       STG:  Patient will decrease pain to 0-2/10 in 4 weeks.   Patient will increase strength by >/= 1/2 mm grade in 4 weeks.  Oswestry: -6 in 4 weeks.    LTG:  Patient will be able to walk >10 mins without increased pain in 8 weeks.  Patient will be able to ascend/descend stairs with step through pattern in 8 weeks.   Patient will increase LE flexibility to min tight in 8 weeks.   Patient will be able to maintain SLS >/= 10 seconds in 8 weeks.                Plan of care was developed with input and agreement by the patient      Treatment Performed:  Recheck completed   Reviewed HEP     Charges: TE x2    Assessment: Patient only gets relief when in the water so provided patient with aquatic HEP packet to take to community pool. Also provided patient with 30 day [facility] pass.        Plan: Updated 6/24/2025    Patient with too much pain with land exercises so will continue aquatic HEP independently.     Carrie Choudhury, PT

## 2025-06-24 ENCOUNTER — TREATMENT (OUTPATIENT)
Dept: PHYSICAL THERAPY | Facility: CLINIC | Age: OVER 89
End: 2025-06-24
Payer: MEDICARE

## 2025-06-24 DIAGNOSIS — M54.16 LUMBAR RADICULOPATHY: Primary | ICD-10-CM

## 2025-06-24 PROCEDURE — 97110 THERAPEUTIC EXERCISES: CPT | Mod: GP

## 2025-06-24 ASSESSMENT — PAIN - FUNCTIONAL ASSESSMENT: PAIN_FUNCTIONAL_ASSESSMENT: 0-10

## 2025-06-24 ASSESSMENT — PAIN SCALES - GENERAL: PAINLEVEL_OUTOF10: 6

## 2025-06-27 ENCOUNTER — TREATMENT (OUTPATIENT)
Dept: PHYSICAL THERAPY | Facility: CLINIC | Age: OVER 89
End: 2025-06-27
Payer: MEDICARE

## 2025-06-27 DIAGNOSIS — R26.89 IMBALANCE: ICD-10-CM

## 2025-06-27 DIAGNOSIS — R29.6 FALLING EPISODES: ICD-10-CM

## 2025-06-27 DIAGNOSIS — H81.10 BENIGN PAROXYSMAL POSITIONAL VERTIGO, UNSPECIFIED LATERALITY: ICD-10-CM

## 2025-06-27 DIAGNOSIS — R42 DIZZINESS: Primary | ICD-10-CM

## 2025-06-27 DIAGNOSIS — R26.81 UNSTEADINESS ON FEET: ICD-10-CM

## 2025-06-27 DIAGNOSIS — H81.10 BPPV (BENIGN PAROXYSMAL POSITIONAL VERTIGO), UNSPECIFIED LATERALITY: ICD-10-CM

## 2025-06-27 PROCEDURE — 95992 CANALITH REPOSITIONING PROC: CPT | Mod: GP | Performed by: PHYSICAL THERAPIST

## 2025-06-27 PROCEDURE — 97112 NEUROMUSCULAR REEDUCATION: CPT | Mod: GP | Performed by: PHYSICAL THERAPIST

## 2025-06-27 PROCEDURE — 97116 GAIT TRAINING THERAPY: CPT | Mod: GP | Performed by: PHYSICAL THERAPIST

## 2025-06-27 ASSESSMENT — PAIN - FUNCTIONAL ASSESSMENT: PAIN_FUNCTIONAL_ASSESSMENT: 0-10

## 2025-06-27 ASSESSMENT — PAIN SCALES - GENERAL: PAINLEVEL_OUTOF10: 0 - NO PAIN

## 2025-06-27 NOTE — PROGRESS NOTES
Physical Therapy    Physical Therapy Treatment    Patient Name: Annita Mcleod  MRN: [id number]  Today's Date: 6/27/2025  [facility]  Visit number=2   Primary dx=dizziness  Time Entry:   Time Calculation  Start Time: 1018  Stop Time: 1111  Time Calculation (min): 53 min     PT Therapeutic Procedures Time Entry  Neuromuscular Re-Education Time Entry: 30  Gait Training Time Entry: 8  Canalith Repositioning Time Entry: 15        Assessment:  Pt. Presented with horizontal cupulolithiasis on the right side this date with worse dizziness and apogeotropic nystagmus noted on the left side.  Pt. Scored an 11/30 on the FGA, scoring in a fall risk category compared to cut off score of 23/30. Recommended pt. Utilize a walker in community distances, pt. Dtr. Encouraged pt. As well however pt. Was hesitant about walker usage.  The patient will highly benefit from further balance physical therapy and canalith repositioning.  Pt. Also presents with reduced cervical ROM and pain which may need to be addressed during POC.     Plan: establish balance HEP       Current Problem  1. Dizziness  Follow Up In Physical Therapy      2. Benign paroxysmal positional vertigo, unspecified laterality  Follow Up In Physical Therapy      3. Imbalance        4. Unsteadiness on feet        5. Falling episodes        6. BPPV (benign paroxysmal positional vertigo), unspecified laterality                  Subjective    Pt. Has not had episodes of vertigo since she was here last.  Pt. Dtr. Present for full visit.      Precautions: mod fall risk, osteoporosis     Vital Signs: not taken     Pain  Pain Assessment  Pain Assessment: 0-10  0-10 (Numeric) Pain Score: 0 - No pain    Objective     Roll Test R=apogeotropic nystagmus with dizzy less than left  Roll test L=apogeotropic nystagmus with c/o dizzy (L>R)  Supine to sitting=nausea and dizziness  Bert Hallpike test R= neg. For nystagmus with mild report of dizzy which was alleviated with cervical distraction  [place]  hallpike test L=no nystagmus or dizzy  [place] and lean tests=unable to determine nystagmus       Outcome Measures:  FGA - Functional Gait Assessment  Gait level surface: 2  Change in gait speed: 2  Gait with horizontal head turns: 1  Gait with vertical head turns: 1  Gait and pivot turn: 1  Step over obstacle: 1  Gait with narrow base of support: 0  Gait with eyes closed: 0  Ambulating backwards: 1 (27 seconds)  Steps: 2  FGA Total Score: 11    Treatments:    Gait training:  -functional mobility with unilateral trekking pole 60'x1 (VC for placement of pole)  -educated pt. On my recommendation to utilize walker in community to dec. Fall risk    Neuro re-Ed:  The following exercises were completed at supervision level over 20ft. distance  Gait level surface:  Change in gait speed:  Gait with horizontal head turns:  Gait with vertical head turns:  Gait with pivot turn:  Step over obstacle:  Gait with narrow AMANDEEP:  Gait with eyes closed:  Ambulating backwards:  Stairs 3-6 inch stairs   Educated pt. On fall risk cut off score.   -functional mobility with head turns 30'x2 with VC for wider AMANDEEP and utilize visual cues  -functional mobility with vertical head nods 30'x2 with VC for wider AMANDEEP and utilize visual cues    Canalith repositioning:  -roll test B x2 each side  -charito hallpike x1 each side  -educated pt/dtr. On prolongued lying for right horizontal cupulolithiasis   -bow and lean test    OP EDUCATION:  -see treatment         Goals:  Active       PT Problem       PT Goal 1 STG: Pt. Will improve DHI by 9 points to meet MCID for significant change and indicate improved quality of life by end of POC.        Start:  06/19/25    Expected End:  09/17/25            PT Goal 2 STG: Pt. Will be independent in HEP in next 1-2 visits to promote self efficacy, manage symptoms and meet other LTG.        Start:  06/19/25    Expected End:  09/17/25            PT Goal 3 STG: pt. will test negative for dizziness on canalith positional  "testing by visit 5       Start:  06/19/25    Expected End:  09/17/25            PT Goal 4 STG: Pt. will complete MCTSIB in next 1-2 visits       Start:  06/19/25    Expected End:  09/17/25            PT Goal 5 LTG: Pt. Will improve DHI by 18 points to meet MCID for significant change and indicate improved quality of life by end of POC.        Start:  06/19/25    Expected End:  09/17/25            Patient Stated Goal 1: Pt. Goal: \"no dizzy spells\"        Start:  06/19/25    Expected End:  09/17/25            PT Goal 6: Pt. Will improve EC on foam condition of MCTSIB to indicate improved utilization of vestibular cues for balance by end of POC.        Start:  06/19/25    Expected End:  09/17/25                PT Goal 7: Pt. Will improve FGA by 4 points in order to meet MCID for significant change compared to normative values and indicate improved dynamic balance.        Start:  06/27/25    Expected End:  09/17/25                  "

## 2025-07-01 ENCOUNTER — APPOINTMENT (OUTPATIENT)
Dept: PHYSICAL THERAPY | Facility: CLINIC | Age: OVER 89
End: 2025-07-01
Payer: MEDICARE

## 2025-07-01 ENCOUNTER — TELEPHONE (OUTPATIENT)
Dept: PRIMARY CARE | Facility: CLINIC | Age: OVER 89
End: 2025-07-01
Payer: MEDICARE

## 2025-07-05 DIAGNOSIS — I25.10 ATHEROSCLEROSIS OF NATIVE CORONARY ARTERY OF NATIVE HEART WITHOUT ANGINA PECTORIS: ICD-10-CM

## 2025-07-05 DIAGNOSIS — E03.9 ACQUIRED HYPOTHYROIDISM: ICD-10-CM

## 2025-07-05 DIAGNOSIS — I10 PRIMARY HYPERTENSION: Primary | ICD-10-CM

## 2025-07-05 DIAGNOSIS — E78.5 HYPERLIPIDEMIA, UNSPECIFIED HYPERLIPIDEMIA TYPE: ICD-10-CM

## 2025-07-05 DIAGNOSIS — Z00.00 ROUTINE GENERAL MEDICAL EXAMINATION AT A HEALTH CARE FACILITY: ICD-10-CM

## 2025-07-05 DIAGNOSIS — E87.5 HYPERKALEMIA: ICD-10-CM

## 2025-07-06 LAB
ALBUMIN SERPL-MCNC: 4.2 G/DL (ref 3.6–5.1)
ALP SERPL-CCNC: 77 U/L (ref 37–153)
ALT SERPL-CCNC: 10 U/L (ref 6–29)
ANION GAP SERPL CALCULATED.4IONS-SCNC: 9 MMOL/L (CALC) (ref 7–17)
APPEARANCE UR: CLEAR
AST SERPL-CCNC: 16 U/L (ref 10–35)
BACTERIA #/AREA URNS HPF: ABNORMAL /HPF
BASOPHILS # BLD AUTO: 19 CELLS/UL (ref 0–200)
BASOPHILS NFR BLD AUTO: 0.4 %
BILIRUB SERPL-MCNC: 0.5 MG/DL (ref 0.2–1.2)
BILIRUB UR QL STRIP: NEGATIVE
BUN SERPL-MCNC: 22 MG/DL (ref 7–25)
CALCIUM SERPL-MCNC: 9.5 MG/DL (ref 8.6–10.4)
CHLORIDE SERPL-SCNC: 103 MMOL/L (ref 98–110)
CHOLEST SERPL-MCNC: 227 MG/DL
CHOLEST/HDLC SERPL: 4.6 (CALC)
CO2 SERPL-SCNC: 25 MMOL/L (ref 20–32)
COLOR UR: YELLOW
CREAT SERPL-MCNC: 0.88 MG/DL (ref 0.6–0.95)
EGFRCR SERPLBLD CKD-EPI 2021: 61 ML/MIN/1.73M2
EOSINOPHIL # BLD AUTO: 139 CELLS/UL (ref 15–500)
EOSINOPHIL NFR BLD AUTO: 2.9 %
ERYTHROCYTE [DISTWIDTH] IN BLOOD BY AUTOMATED COUNT: 13.3 % (ref 11–15)
GLUCOSE SERPL-MCNC: 104 MG/DL (ref 65–99)
GLUCOSE UR QL STRIP: NEGATIVE
HCT VFR BLD AUTO: 47.5 % (ref 35–45)
HDLC SERPL-MCNC: 49 MG/DL
HGB BLD-MCNC: 14.9 G/DL (ref 11.7–15.5)
HGB UR QL STRIP: NEGATIVE
HYALINE CASTS #/AREA URNS LPF: ABNORMAL /LPF
KETONES UR QL STRIP: NEGATIVE
LDLC SERPL CALC-MCNC: 144 MG/DL (CALC)
LEUKOCYTE ESTERASE UR QL STRIP: ABNORMAL
LYMPHOCYTES # BLD AUTO: 1790 CELLS/UL (ref 850–3900)
LYMPHOCYTES NFR BLD AUTO: 37.3 %
MCH RBC QN AUTO: 28.4 PG (ref 27–33)
MCHC RBC AUTO-ENTMCNC: 31.4 G/DL (ref 32–36)
MCV RBC AUTO: 90.6 FL (ref 80–100)
MONOCYTES # BLD AUTO: 331 CELLS/UL (ref 200–950)
MONOCYTES NFR BLD AUTO: 6.9 %
NEUTROPHILS # BLD AUTO: 2520 CELLS/UL (ref 1500–7800)
NEUTROPHILS NFR BLD AUTO: 52.5 %
NITRITE UR QL STRIP: NEGATIVE
NONHDLC SERPL-MCNC: 178 MG/DL (CALC)
PH UR STRIP: 6 [PH] (ref 5–8)
PLATELET # BLD AUTO: 314 THOUSAND/UL (ref 140–400)
PMV BLD REES-ECKER: 8.9 FL (ref 7.5–12.5)
POTASSIUM SERPL-SCNC: 5.6 MMOL/L (ref 3.5–5.3)
PROT SERPL-MCNC: 6.6 G/DL (ref 6.1–8.1)
PROT UR QL STRIP: NEGATIVE
RBC # BLD AUTO: 5.24 MILLION/UL (ref 3.8–5.1)
RBC #/AREA URNS HPF: ABNORMAL /HPF
SERVICE CMNT-IMP: ABNORMAL
SODIUM SERPL-SCNC: 137 MMOL/L (ref 135–146)
SP GR UR STRIP: 1.02 (ref 1–1.03)
SQUAMOUS #/AREA URNS HPF: ABNORMAL /HPF
TRIGL SERPL-MCNC: 200 MG/DL
TSH SERPL-ACNC: 2.6 MIU/L (ref 0.4–4.5)
WBC # BLD AUTO: 4.8 THOUSAND/UL (ref 3.8–10.8)
WBC #/AREA URNS HPF: ABNORMAL /HPF

## 2025-07-07 ENCOUNTER — APPOINTMENT (OUTPATIENT)
Dept: PHYSICAL THERAPY | Facility: CLINIC | Age: OVER 89
End: 2025-07-07
Payer: MEDICARE

## 2025-07-07 DIAGNOSIS — M54.16 LUMBAR RADICULOPATHY: Primary | ICD-10-CM

## 2025-07-07 RX ORDER — SODIUM POLYSTYRENE SULFONATE 4.1 MEQ/G
POWDER, FOR SUSPENSION ORAL; RECTAL ONCE
Qty: 15 G | Refills: 0 | Status: SHIPPED | OUTPATIENT
Start: 2025-07-07 | End: 2025-07-07

## 2025-07-08 ENCOUNTER — TREATMENT (OUTPATIENT)
Dept: PHYSICAL THERAPY | Facility: CLINIC | Age: OVER 89
End: 2025-07-08
Payer: MEDICARE

## 2025-07-08 DIAGNOSIS — R26.89 IMBALANCE: Primary | ICD-10-CM

## 2025-07-08 DIAGNOSIS — R26.81 UNSTEADINESS ON FEET: ICD-10-CM

## 2025-07-08 DIAGNOSIS — H81.10 BENIGN PAROXYSMAL POSITIONAL VERTIGO, UNSPECIFIED LATERALITY: ICD-10-CM

## 2025-07-08 DIAGNOSIS — R42 DIZZINESS: ICD-10-CM

## 2025-07-08 PROCEDURE — 97112 NEUROMUSCULAR REEDUCATION: CPT | Mod: GP | Performed by: PHYSICAL THERAPIST

## 2025-07-08 NOTE — PROGRESS NOTES
"Physical Therapy    Physical Therapy Treatment    Patient Name: Annita Mlceod  MRN: [id number]  Today's Date: 7/8/2025  [facility]  Visit number=3   Primary dx=dizziness  Time Entry:   Time Calculation  Start Time: 1705  Stop Time: 1745  Time Calculation (min): 40 min     PT Therapeutic Procedures Time Entry  Neuromuscular Re-Education Time Entry: 40        Assessment:  Pt. Tested negative for dizziness on R and L roll tests this date although pt. Continues to present with apogeotropic nystagmus.  Discussed plan for pt. To continue prolonged lying for left sided horizontal cupulolithiasis for HEP as prescribed in her last visit. Ms. Mcleod demonstrates excellent balance with eyes open conditions of balance. However pt. Is challenged with eyes closed on uneven surfaces which is consistent with vestibular impairment.  Pt. Will benefit from physical therapy services to continue to address high level balance deficits.         Plan: establish balance HEP       Current Problem  1. Imbalance        2. Benign paroxysmal positional vertigo, unspecified laterality  Follow Up In Physical Therapy      3. Dizziness  Follow Up In Physical Therapy      4. Unsteadiness on feet                  Subjective    Pt. Has not not had vertigo. Not much is new.  She walked outside.       Precautions: mod fall risk, osteoporosis     Vital Signs: not taken     Pain: no pain       Objective     Roll Test R=apogeotropic nystagmus with no dizzy (R>L)  Roll test L=apogeotropic nystagmus with no dizzy      [place] an lean tests=unable to determine nystagmus direction            Treatments:    Neuro re-Ed: 35'x1  -MTS EO horizontal HT x10  -MTS EO vertical HT x10  -FT EC on firm surface 30\"X1  -FT EC with horizontal/vertical HT  -TS 10\"x2  -SLS 3 reps each side  -MTS EC 30\"x1  -wobble board F/B WS x20 sagittal plane  -EC on wobble board sagittal plane 10'x1  -provided pt. With printed handout for foam balance pad, slip in shoes with support  -recommended rollator " "walker for community distances however pt. Declined this recommendation     Canalith repositioning 5'x1 billed with neuro re-ed:  -roll test B x2 each side     OP EDUCATION:  -see treatment  -TS  -SLS  -gait with horizontal HT         Goals:  Active       PT Problem       PT Goal 1 STG: Pt. Will improve DHI by 9 points to meet MCID for significant change and indicate improved quality of life by end of POC.        Start:  06/19/25    Expected End:  09/17/25            PT Goal 2 STG: Pt. Will be independent in HEP in next 1-2 visits to promote self efficacy, manage symptoms and meet other LTG.        Start:  06/19/25    Expected End:  09/17/25            PT Goal 3 STG: pt. will test negative for dizziness on canalith positional testing by visit 5       Start:  06/19/25    Expected End:  09/17/25            PT Goal 4 STG: Pt. will complete MCTSIB in next 1-2 visits       Start:  06/19/25    Expected End:  09/17/25            PT Goal 5 LTG: Pt. Will improve DHI by 18 points to meet MCID for significant change and indicate improved quality of life by end of POC.        Start:  06/19/25    Expected End:  09/17/25            Patient Stated Goal 1: Pt. Goal: \"no dizzy spells\"        Start:  06/19/25    Expected End:  09/17/25            PT Goal 6: Pt. Will improve EC on foam condition of MCTSIB to indicate improved utilization of vestibular cues for balance by end of POC.        Start:  06/19/25    Expected End:  09/17/25                PT Goal 7: Pt. Will improve FGA by 4 points in order to meet MCID for significant change compared to normative values and indicate improved dynamic balance.        Start:  06/27/25    Expected End:  09/17/25                    "

## 2025-07-16 ENCOUNTER — APPOINTMENT (OUTPATIENT)
Dept: PHYSICAL THERAPY | Facility: CLINIC | Age: OVER 89
End: 2025-07-16
Payer: MEDICARE

## 2025-07-16 DIAGNOSIS — M54.16 LUMBAR RADICULOPATHY: Primary | ICD-10-CM

## 2025-07-17 ENCOUNTER — APPOINTMENT (OUTPATIENT)
Dept: PRIMARY CARE | Facility: CLINIC | Age: OVER 89
End: 2025-07-17
Payer: MEDICARE

## 2025-07-17 VITALS
HEART RATE: 66 BPM | DIASTOLIC BLOOD PRESSURE: 66 MMHG | BODY MASS INDEX: 22.71 KG/M2 | SYSTOLIC BLOOD PRESSURE: 113 MMHG | WEIGHT: 123.4 LBS | OXYGEN SATURATION: 95 % | HEIGHT: 62 IN

## 2025-07-17 DIAGNOSIS — I10 PRIMARY HYPERTENSION: Primary | ICD-10-CM

## 2025-07-17 DIAGNOSIS — Z79.899 ENCOUNTER FOR MEDICATION REVIEW: ICD-10-CM

## 2025-07-17 PROBLEM — N18.31 CHRONIC KIDNEY DISEASE, STAGE 3A (MULTI): Status: RESOLVED | Noted: 2024-10-31 | Resolved: 2025-07-17

## 2025-07-17 PROCEDURE — 3078F DIAST BP <80 MM HG: CPT | Performed by: STUDENT IN AN ORGANIZED HEALTH CARE EDUCATION/TRAINING PROGRAM

## 2025-07-17 PROCEDURE — 3074F SYST BP LT 130 MM HG: CPT | Performed by: STUDENT IN AN ORGANIZED HEALTH CARE EDUCATION/TRAINING PROGRAM

## 2025-07-17 PROCEDURE — G2211 COMPLEX E/M VISIT ADD ON: HCPCS | Performed by: STUDENT IN AN ORGANIZED HEALTH CARE EDUCATION/TRAINING PROGRAM

## 2025-07-17 PROCEDURE — 1036F TOBACCO NON-USER: CPT | Performed by: STUDENT IN AN ORGANIZED HEALTH CARE EDUCATION/TRAINING PROGRAM

## 2025-07-17 PROCEDURE — 1124F ACP DISCUSS-NO DSCNMKR DOCD: CPT | Performed by: STUDENT IN AN ORGANIZED HEALTH CARE EDUCATION/TRAINING PROGRAM

## 2025-07-17 PROCEDURE — 1160F RVW MEDS BY RX/DR IN RCRD: CPT | Performed by: STUDENT IN AN ORGANIZED HEALTH CARE EDUCATION/TRAINING PROGRAM

## 2025-07-17 PROCEDURE — 99213 OFFICE O/P EST LOW 20 MIN: CPT | Performed by: STUDENT IN AN ORGANIZED HEALTH CARE EDUCATION/TRAINING PROGRAM

## 2025-07-17 PROCEDURE — 1159F MED LIST DOCD IN RCRD: CPT | Performed by: STUDENT IN AN ORGANIZED HEALTH CARE EDUCATION/TRAINING PROGRAM

## 2025-07-17 RX ORDER — METOPROLOL SUCCINATE 25 MG/1
12.5 TABLET, EXTENDED RELEASE ORAL DAILY
COMMUNITY

## 2025-07-17 ASSESSMENT — PATIENT HEALTH QUESTIONNAIRE - PHQ9
SUM OF ALL RESPONSES TO PHQ9 QUESTIONS 1 AND 2: 0
1. LITTLE INTEREST OR PLEASURE IN DOING THINGS: NOT AT ALL
2. FEELING DOWN, DEPRESSED OR HOPELESS: NOT AT ALL

## 2025-07-17 NOTE — PATIENT INSTRUCTIONS
We are on a new system that is paperless.     Lab location for blood work :  1. Located across the office , just past the elevators .   2. [address].  If you are coming on a Saturday, go to [address] for the blood draw    Radiology : [address] for Xrays  You can also schedule non urgent imaging by calling .     For scheduling appts, call . You might be receiving call from central scheduling as well.    For scheduling colonoscopy, call .     For scheduling physical therapy, call [phone] ( [phone]).    For any other scheduling questions or locations, please ask the medical assistant or the .

## 2025-07-17 NOTE — PROGRESS NOTES
"Subjective   Patient ID: Annita Mcleod is a 89 y.o. female who presents for Follow-up (7 month follow-up, medications, and discuss potassium.).        HPI    89 y/o female with CAD s/p 2005 with MI in 2021 s/p PCI , mild - moderate depression , hypothyroidism and paresthesia of LE        Is here with her daughter   Pill bottles were brought to the office today   Daughter is not sure of her meds, it appears that she might not be taking Losartan 50mg for months       Mild elevation - 0.2 noted on the recent     Visit Vitals  /66   Pulse 66   Ht 1.575 m (5' 2\")   Wt 56 kg (123 lb 6.4 oz)   SpO2 95%   BMI 22.57 kg/m²   Smoking Status Never   BSA 1.57 m²      No LMP recorded.   Current Outpatient Medications   Medication Instructions    albuterol 90 mcg/actuation inhaler 2 puffs, inhalation, Every 6 hours PRN    albuterol 2.5 mg, nebulization, Every 6 hours PRN    aspirin 81 mg, Daily    atorvastatin (Lipitor) 40 mg tablet 1 tablet, Nightly    cholecalciferol (VITAMIN D-3) 25 mcg, Daily    cholestyramine (Questran) 4 gram packet 1 packet, 2 times daily    clopidogrel (PLAVIX) 75 mg, oral, Daily    fluticasone (Flovent) 110 mcg/actuation inhaler 2 puffs, 2 times daily RT    levothyroxine (SYNTHROID, LEVOXYL) 50 mcg, oral, Daily    losartan (COZAAR) 50 mg, oral, Daily    meclizine (ANTIVERT) 12.5 mg, oral, 3 times daily PRN    metoprolol succinate XL (TOPROL-XL) 12.5 mg, Daily    nitroglycerin (NITROSTAT) 0.4 mg, Every 5 min PRN    ranolazine (RANEXA) 500 mg, 2 times daily    sertraline (ZOLOFT) 25 mg, oral, Daily    spironolactone (ALDACTONE) 25 mg, Daily      Social History     Tobacco Use    Smoking status: Never    Smokeless tobacco: Never   Substance Use Topics    Alcohol use: Not Currently        Review of Systems    Constitutional : No feeling poorly / fevers/ chills / night sweats/ fatigue   Cardiovascular : No CP /Palpitations/ lower extremity edema / syncope   Respiratory : No Cough /GODOY/Dyspnea at rest "       Psychiatric: No anxiety/ depression/ SI/HI    All other systems have been reviewed and are negative for complaint       Physical Exam    Constitutional : Vitals reviewed. Alert and in no distress  Cardiovascular : RRR, Normal S1, S2, no peripheral edema   Pulmonary: No respiratory distress, CTAB     Neurologic : CNs 2-12 grossly intact , no obvious FNDs  Psych : A,Ox3, normal mood and affect      Assessment/Plan   Diagnoses and all orders for this visit:  Primary hypertension  -     Basic Metabolic Panel; Future  Encounter for medication review    89 y/o female with CAD s/p 2005 with MI in 2021 s/p PCI , mild - moderate depression , hypothyroidism and paresthesia of LE          Continue to hold Losartan 50mg which she has not been taking for months and BP has been WNL   Continue to hold  losartan 50    Med list updated.     BMP to be done today to monitor K .     CBC, CMP , Tsh , Lipid , UA  from 7/5 reviewed b   She has not been taking Lipitor which explains elevated Lipids, advised to resume Lipitor 40mg .       RTO  in Dec for [facility]          Conditions addressed and mgmt as noted above.  Pertinent labs, images/ imaging reports , chart review was done .   Age appropriate labs / labs for mgmt of chronic medical conditions ordered, further mgmt pending the results.         For the sake of billing , entire note needs to be taken into consideration. Repetition of meds is avoided for the sake of redundancy. Med list above reflects reconciled meds. Management of abnormal results may not always result in an addendum to the note , an annotation at the end of result or communication via pt portal is to be considered .      This note is intended for the physician writing it, as well as to communicate findings to other healthcare professionals. These notes use medical lexicon that may be misunderstood by non medical persons. Therefore, interpretations of medical notes and terminology should be approached with caution.

## 2025-07-18 ENCOUNTER — TREATMENT (OUTPATIENT)
Dept: PHYSICAL THERAPY | Facility: CLINIC | Age: OVER 89
End: 2025-07-18
Payer: MEDICARE

## 2025-07-18 DIAGNOSIS — H81.10 BENIGN PAROXYSMAL POSITIONAL VERTIGO, UNSPECIFIED LATERALITY: ICD-10-CM

## 2025-07-18 DIAGNOSIS — R29.6 FALLING EPISODES: ICD-10-CM

## 2025-07-18 DIAGNOSIS — R42 DIZZINESS: ICD-10-CM

## 2025-07-18 DIAGNOSIS — R26.89 IMBALANCE: Primary | ICD-10-CM

## 2025-07-18 PROCEDURE — 97112 NEUROMUSCULAR REEDUCATION: CPT | Mod: GP | Performed by: PHYSICAL THERAPIST

## 2025-07-18 PROCEDURE — 97110 THERAPEUTIC EXERCISES: CPT | Mod: GP | Performed by: PHYSICAL THERAPIST

## 2025-07-18 ASSESSMENT — PAIN SCALES - WONG BAKER: WONGBAKER_NUMERICALRESPONSE: HURTS WHOLE LOT

## 2025-07-18 ASSESSMENT — PAIN - FUNCTIONAL ASSESSMENT: PAIN_FUNCTIONAL_ASSESSMENT: WONG-BAKER FACES

## 2025-07-18 NOTE — PROGRESS NOTES
"Physical Therapy    Physical Therapy Treatment    Patient Name: Annita Mcleod  MRN: [id number]  Today's Date: 7/18/2025  [facility]  Visit number=4   Primary dx=dizziness  Time Entry:   Time Calculation  Start Time: 1348  Stop Time: 1430  Time Calculation (min): 42 min     PT Therapeutic Procedures Time Entry  Therapeutic Exercise Time Entry: 32  Neuromuscular Re-Education Time Entry: 10        Assessment:  Pt. Began visit with elevated chronic low back pain. Pt. Performed repeated lumbar flexion with reduced low back pain following these exercises.  Refrained from adding more challenging dynamic balance exercises in order to avoid exacerbating low back pain during today's visit.  Provided pt. With standing hip strengthening exercises, pt. With no pain following during or after progression to standing strengthening.       Plan: promoting SLS, core strengthening, floor transfers       Current Problem  1. Imbalance        2. Benign paroxysmal positional vertigo, unspecified laterality  Follow Up In Physical Therapy      3. Dizziness  Follow Up In Physical Therapy      4. Falling episodes                    Subjective    Pt. Has not not had vertigo. She didn't do her exercises as much as she should have. Pt. Dtr. Present for full visit. She walked yesterday, her route takes her usually about 30 minutes, she walks slowly.  She has some pain in her back but she has this a lot before therapy.    Precautions: mod fall risk, osteoporosis     Vital Signs: not taken     Pain: no pain  Pain Assessment  Pain Assessment: Elliott-Baker FACES  Elliott-Baker FACES Pain Rating: Hurts whole lot    Objective             Treatments:    Neuro re-Ed: 35'x1  -sit<>Stands with FA on foam 1x10 with VC for slight knee flexion  -cone taps over midline with CGA alternating sides x15 total    There Ex:  -repeated lumbar flexion x10 with 5\" x2 rounds hold with green stability ball  -standing hip ABD with BUE support with YTB ankles x10 on each side  -standing " "hip flexion with YTB with band on ankles x10  -standing rows 1x10 with RTB    OP EDUCATION:  -see treatment  -TS  -SLS  -gait with horizontal HT  Access Code: [email or web address]  URL: [email or web address]  Date: 07/18/2025  Prepared by: Dayna Anderson    Exercises  - Sit to Stand Without Arm Support  - 1-2 x daily - 7 x weekly - 1 sets - 10 reps  - Standing Hip Abduction with Counter Support  - 2-3 x weekly - 2 sets - 10 reps - 3 hold  - Standing Hip Flexion with Resistance at Ankles and Counter Support  - 2-3 x weekly - 2 sets - 10 reps - 2 hold  -cone taps with dtr.         Goals:  Active       PT Problem       PT Goal 1 STG: Pt. Will improve DHI by 9 points to meet MCID for significant change and indicate improved quality of life by end of POC.        Start:  06/19/25    Expected End:  09/17/25            PT Goal 2 STG: Pt. Will be independent in HEP in next 1-2 visits to promote self efficacy, manage symptoms and meet other LTG.        Start:  06/19/25    Expected End:  09/17/25            PT Goal 3 STG: pt. will test negative for dizziness on canalith positional testing by visit 5       Start:  06/19/25    Expected End:  09/17/25            PT Goal 4 STG: Pt. will complete MCTSIB in next 1-2 visits       Start:  06/19/25    Expected End:  09/17/25            PT Goal 5 LTG: Pt. Will improve DHI by 18 points to meet MCID for significant change and indicate improved quality of life by end of POC.        Start:  06/19/25    Expected End:  09/17/25            Patient Stated Goal 1: Pt. Goal: \"no dizzy spells\"        Start:  06/19/25    Expected End:  09/17/25            PT Goal 6: Pt. Will improve EC on foam condition of MCTSIB to indicate improved utilization of vestibular cues for balance by end of POC.        Start:  06/19/25    Expected End:  09/17/25                PT Goal 7: Pt. Will improve FGA by 4 points in order to meet MCID for significant change compared to normative values and indicate " improved dynamic balance.        Start:  06/27/25    Expected End:  09/17/25

## 2025-07-22 ENCOUNTER — APPOINTMENT (OUTPATIENT)
Dept: PHYSICAL THERAPY | Facility: CLINIC | Age: OVER 89
End: 2025-07-22
Payer: MEDICARE

## 2025-07-25 ENCOUNTER — APPOINTMENT (OUTPATIENT)
Dept: PHYSICAL THERAPY | Facility: CLINIC | Age: OVER 89
End: 2025-07-25
Payer: MEDICARE

## 2025-07-25 DIAGNOSIS — M54.16 LUMBAR RADICULOPATHY: Primary | ICD-10-CM

## 2025-07-30 ENCOUNTER — TREATMENT (OUTPATIENT)
Dept: PHYSICAL THERAPY | Facility: CLINIC | Age: OVER 89
End: 2025-07-30
Payer: MEDICARE

## 2025-07-30 DIAGNOSIS — H81.10 BENIGN PAROXYSMAL POSITIONAL VERTIGO, UNSPECIFIED LATERALITY: ICD-10-CM

## 2025-07-30 DIAGNOSIS — R42 DIZZINESS: Primary | ICD-10-CM

## 2025-07-30 LAB
ANION GAP SERPL CALCULATED.4IONS-SCNC: 13 MMOL/L (CALC) (ref 7–17)
BUN SERPL-MCNC: 30 MG/DL (ref 7–25)
BUN/CREAT SERPL: 34 (CALC) (ref 6–22)
CALCIUM SERPL-MCNC: 9.4 MG/DL (ref 8.6–10.4)
CHLORIDE SERPL-SCNC: 107 MMOL/L (ref 98–110)
CO2 SERPL-SCNC: 20 MMOL/L (ref 20–32)
CREAT SERPL-MCNC: 0.87 MG/DL (ref 0.6–0.95)
EGFRCR SERPLBLD CKD-EPI 2021: 62 ML/MIN/1.73M2
GLUCOSE SERPL-MCNC: 100 MG/DL (ref 65–99)
POTASSIUM SERPL-SCNC: 4.6 MMOL/L (ref 3.5–5.3)
SODIUM SERPL-SCNC: 140 MMOL/L (ref 135–146)

## 2025-07-30 PROCEDURE — 97110 THERAPEUTIC EXERCISES: CPT | Mod: GP | Performed by: PHYSICAL THERAPIST

## 2025-07-30 ASSESSMENT — PAIN - FUNCTIONAL ASSESSMENT: PAIN_FUNCTIONAL_ASSESSMENT: 0-10

## 2025-07-30 ASSESSMENT — PAIN SCALES - GENERAL: PAINLEVEL_OUTOF10: 9

## 2025-07-30 NOTE — PROGRESS NOTES
Physical Therapy    Physical Therapy Treatment/Progress Check    Patient Name: Annita Mcleod  MRN: [id number]  Today's Date: 7/30/2025  [facility]  Visit number=5   Primary dx=dizziness  Time Entry:   Time Calculation  Start Time: 1119  Stop Time: 1152  Time Calculation (min): 33 min     PT Therapeutic Procedures Time Entry  Therapeutic Exercise Time Entry: 33        Assessment:  Progress check performed today to assess pt. Progress towards long term goals and determine need for continued physical therapy. The patient has met 4/4 of her short term goals and 1/3 of her long term goals.  Ms. Mcleod is presenting with reduced dizziness and improved her score on the DHI from a 72/100 to a 0/100 today.  The FGA was deferred today due to acute on chronic low back pain.  The patient will benefit from continued physical therapy to address her static and dynamic balance deficits due to pt. Scoring an 11/30 on the FGA initially which is far below closest age matched normative value of a 21/30 for ages 81-89. However, if pt. Low back pain continues to limit patient pt. Will be discharged earlier.           Plan: static balance exercises on foam, promoting SLS       Current Problem  1. Dizziness  Follow Up In Physical Therapy      2. Benign paroxysmal positional vertigo, unspecified laterality  Follow Up In Physical Therapy                  Subjective    Pt. Reports that she has been having problems with her back, she has had this before.  She goes to the swimming pool and that has always helped her. She tried doing her exercises but it hurt her back. She doesn't feel it during exercises but afterwards it she might feel it.  She took advil today.  She hasn't had any dizziness.    Precautions: mod fall risk, osteoporosis     Vital Signs: not taken     Pain:  Pain Assessment  Pain Assessment: 0-10  0-10 (Numeric) Pain Score: 9  Pain Type: Chronic pain  Pain Location: Back  Pain Orientation: Lower    Objective           Outcome  "Measures:    Dizziness Handicap Inventory:  IE= 72     FGA - Functional Gait Assessment  7/30/25=deferred due to acute on chronic low back pain today  IE= Total Score: 11    Treatments:    There Ex:  -repeated lumbar flexion x10 with 5\" x2 rounds hold with green stability ball  -recumbent cycle bike with BLE's only for 10'x1 with hot back with 6 layers of heat protection on low back  -educated pt. On plan to omit standing strengthening exercises due to current acute flare up of chronic low back pain    OP EDUCATION:  -see treatment  -TS  -SLS  -gait with horizontal HT  Access Code: [email or web address]  URL: [email or web address]  Date: 07/18/2025  Prepared by: Dayna Anderson    Exercises  - Sit to Stand Without Arm Support  - 1-2 x daily - 7 x weekly - 1 sets - 10 reps  -cone taps with dtr.         Goals:  Active       PT Problem       PT Goal 1 STG: Pt. Will improve DHI by 9 points to meet MCID for significant change and indicate improved quality of life by end of POC.  (Met)       Start:  06/19/25    Expected End:  09/17/25    Resolved:  07/30/25         PT Goal 2 STG: Pt. Will be independent in HEP in next 1-2 visits to promote self efficacy, manage symptoms and meet other LTG.  (Met)       Start:  06/19/25    Expected End:  09/17/25    Resolved:  07/30/25         PT Goal 3 STG: pt. will test negative for dizziness on canalith positional testing by visit 5 (Met)       Start:  06/19/25    Expected End:  09/17/25    Resolved:  07/30/25         PT Goal 4 STG: Pt. will complete MCTSIB in next 1-2 visits (Met)       Start:  06/19/25    Expected End:  09/17/25    Resolved:  07/30/25         PT Goal 5 LTG: Pt. Will improve DHI by 18 points to meet MCID for significant change and indicate improved quality of life by end of POC.  (Met)       Start:  06/19/25    Expected End:  09/17/25    Resolved:  07/30/25         Patient Stated Goal 1: Pt. Goal: \"no dizzy spells\"  (Met)       Start:  06/19/25    Expected End:  " 09/17/25    Resolved:  07/30/25         PT Goal 6: Pt. Will improve EC on foam condition of MCTSIB to indicate improved utilization of vestibular cues for balance by end of POC.  (Progressing)       Start:  06/19/25    Expected End:  09/17/25                PT Goal 7: Pt. Will improve FGA by 4 points in order to meet MCID for significant change compared to normative values and indicate improved dynamic balance.  (Progressing)       Start:  06/27/25    Expected End:  09/17/25                  Insurance Authorization Information  Date of Evaluation: 7/30/2025    Onset Date: 6/12/2025    Referring Physician: Desiree Perez     Surgery in the Last 3 months:  no    CPT Codes: ALLOWED CPT CODES: THEREX (92171), THERE-ACT (82555), NMR  (77720), MANUAL (67916), and GAIT (99928)    Diagnosis:   Problem List Items Addressed This Visit    None  Visit Diagnoses         Codes      Dizziness    -  Primary R42      Benign paroxysmal positional vertigo, unspecified laterality     H81.10               Functional Outcome:  FGA      Which of the following best describes the primary reason of therapy: Improving, restoring, or adapting functional mobility or skills    Visits Requested: 8    Date Range: 90 days    Select all conditions that apply: None of these apply     Re-evaluation only:  Short term goals Met: 4/4    Long term goals Met: 1/3    Dayna Anderson, PT

## 2025-08-19 ENCOUNTER — APPOINTMENT (OUTPATIENT)
Dept: PHYSICAL THERAPY | Facility: CLINIC | Age: OVER 89
End: 2025-08-19
Payer: MEDICARE

## 2025-08-26 ENCOUNTER — APPOINTMENT (OUTPATIENT)
Dept: PHYSICAL THERAPY | Facility: CLINIC | Age: OVER 89
End: 2025-08-26
Payer: MEDICARE

## 2025-08-29 DIAGNOSIS — J45.909 ASTHMA, UNSPECIFIED ASTHMA SEVERITY, UNSPECIFIED WHETHER COMPLICATED, UNSPECIFIED WHETHER PERSISTENT (HHS-HCC): ICD-10-CM

## 2025-08-29 RX ORDER — ALBUTEROL SULFATE 0.83 MG/ML
SOLUTION RESPIRATORY (INHALATION)
Qty: 120 ML | Refills: 11 | Status: SHIPPED | OUTPATIENT
Start: 2025-08-29

## 2025-12-08 ENCOUNTER — APPOINTMENT (OUTPATIENT)
Dept: PRIMARY CARE | Facility: CLINIC | Age: OVER 89
End: 2025-12-08
Payer: MEDICARE